# Patient Record
Sex: FEMALE | Race: WHITE | NOT HISPANIC OR LATINO | Employment: FULL TIME | URBAN - NONMETROPOLITAN AREA
[De-identification: names, ages, dates, MRNs, and addresses within clinical notes are randomized per-mention and may not be internally consistent; named-entity substitution may affect disease eponyms.]

---

## 2022-06-13 PROBLEM — E66.812 CLASS 2 OBESITY DUE TO EXCESS CALORIES WITH BODY MASS INDEX (BMI) OF 35.0 TO 35.9 IN ADULT: Status: ACTIVE | Noted: 2022-06-13

## 2022-12-07 ENCOUNTER — OFFICE VISIT (OUTPATIENT)
Dept: ENDOCRINOLOGY | Facility: CLINIC | Age: 32
End: 2022-12-07

## 2022-12-07 VITALS
HEART RATE: 96 BPM | WEIGHT: 207 LBS | HEIGHT: 64 IN | DIASTOLIC BLOOD PRESSURE: 78 MMHG | SYSTOLIC BLOOD PRESSURE: 122 MMHG | BODY MASS INDEX: 35.34 KG/M2

## 2022-12-07 DIAGNOSIS — E16.1 HYPERINSULINEMIA: Primary | ICD-10-CM

## 2022-12-07 DIAGNOSIS — E28.2 PCOS (POLYCYSTIC OVARIAN SYNDROME): ICD-10-CM

## 2022-12-07 DIAGNOSIS — R42 VERTIGO: ICD-10-CM

## 2022-12-07 RX ORDER — METFORMIN HYDROCHLORIDE 500 MG/1
1000 TABLET, EXTENDED RELEASE ORAL 2 TIMES DAILY WITH MEALS
Qty: 360 TABLET | Refills: 1 | Status: SHIPPED | OUTPATIENT
Start: 2022-12-07 | End: 2023-03-07

## 2022-12-07 RX ORDER — MECLIZINE HCL 12.5 MG/1
12.5 TABLET ORAL EVERY 12 HOURS PRN
Qty: 30 TABLET | Refills: 0 | Status: SHIPPED | OUTPATIENT
Start: 2022-12-07

## 2022-12-07 NOTE — PROGRESS NOTES
Jg Chadwick 27 y o  female MRN: [id number]    Encounter: [id number]      Assessment/Plan     1  Hyperinsulinemia - improving on recent labs  Will continue with metformin 1000 mg bid dosing, which can be continued safely into pregnancy  Continue to encourage active lifestyle and healthy eating  Will arrange future labs for monitoring  2  PCOS - now with more regular cycles  Desires pregnancy  Following with Ob/gyn and fertility    3  ?Vertigo - provided script for meclizine, per patient request  If symptoms do not improve on therapy, recommend follow up with PCP for alternative diagnosis    Problem List Items Addressed This Visit        Digestive    Hyperinsulinemia - Primary    Relevant Medications    metFORMIN (GLUCOPHAGE-XR) 500 mg 24 hr tablet    Other Relevant Orders    Basic metabolic panel Lab Collect    HEMOGLOBIN A1C W/ EAG ESTIMATION Lab Collect    Insulin, fasting       Endocrine    PCOS (polycystic ovarian syndrome)    Relevant Medications    metFORMIN (GLUCOPHAGE-XR) 500 mg 24 hr tablet    Other Relevant Orders    Basic metabolic panel Lab Collect    HEMOGLOBIN A1C W/ EAG ESTIMATION Lab Collect    Insulin, fasting   Other Visit Diagnoses     Vertigo        Relevant Medications    meclizine (ANTIVERT) 12 5 MG tablet        RTC 6-mo    CC: Hyperinsulinemia, PCOS    History of Present Illness     HPI:    Huan Trujillo returns for follow up of hyperinsulinemia, PCOS  Patient with interval history of gyn surgery for fulgeration of endometriosis, drainage of b/l ovarian cysts  Periods have improved since procedure  Patient is interested in pregnancy  She is actively trying for pregnancy and has plans to follow up with fertility medicine if pregnancy not diagnosed in several months  Huan Trujillo is taking and tolerating metformin 1000 mg bid  She is not checking SMBG, but has no hyperglycemic symptoms  She does report weekly episodes of symptoms of dizziness, which are spontaneous and non-provoked   She was previously treated with meclinzine for vertigo  Patient mentions concern for POTS, as well  She has not had LOC, but reports pre-syncopal like symptoms at times  Review of Systems   Constitutional: Negative for diaphoresis and unexpected weight change  HENT: Negative for trouble swallowing and voice change  Gastrointestinal: Negative for vomiting  Endocrine: Negative for polydipsia and polyuria  Neurological: Positive for dizziness and light-headedness  Negative for syncope  Psychiatric/Behavioral: Negative for agitation and behavioral problems  All other systems reviewed and are negative        Historical Information   Past Medical History:   Diagnosis Date   • Benign liver cyst    • Depression    • Ovarian cyst    • PONV (postoperative nausea and vomiting)    • Seasonal allergies      Past Surgical History:   Procedure Laterality Date   • APPENDECTOMY     • CHOLECYSTECTOMY  11/20/2014   • HERNIA REPAIR  2009    x3   • LIVER SURGERY  11/20/2014    liver cyst resection with removal of lesion   • OVARIAN CYST REMOVAL Right 2006   • MA HYSTEROSCOPY,W/ENDO BX N/A 7/21/2022    Procedure: HYSTEROSCOPY, BIOPSY;  Surgeon: Carlota Das DO;  Location: [facility];  Service: Gynecology   • MA LAP,RMV  ADNEXAL STRUCTURE N/A 7/21/2022    Procedure: LAPAROSCOPY; Creig Nightingale OF ENDOMETRIOSIS; DRAINAGE OF B/L OVARIAN CYSTS;;  Surgeon: Carlota Das DO;  Location: [facility];  Service: Gynecology     Social History   Social History     Substance and Sexual Activity   Alcohol Use Yes    Comment: Occasional; socially     Social History     Substance and Sexual Activity   Drug Use Never     Social History     Tobacco Use   Smoking Status Never   Smokeless Tobacco Never     Family History:   Family History   Problem Relation Age of Onset   • Hypertension Mother    • Irritable bowel syndrome Father    • Breast cancer Maternal Aunt    • Diabetes Maternal Grandfather    • Cancer Other        Meds/Allergies   Current Outpatient Medications   Medication Sig Dispense Refill   • fexofenadine-pseudoephedrine (ALLEGRA-D 24) 180-240 MG per 24 hr tablet Take 1 tablet by mouth daily 10 tablet 1   • ibuprofen (MOTRIN) 600 mg tablet Take 1 tablet (600 mg total) by mouth every 6 (six) hours as needed for mild pain 60 tablet 0   • meclizine (ANTIVERT) 12 5 MG tablet Take 1 tablet (12 5 mg total) by mouth every 12 (twelve) hours as needed for dizziness 30 tablet 0   • metFORMIN (GLUCOPHAGE-XR) 500 mg 24 hr tablet Take 2 tablets (1,000 mg total) by mouth 2 (two) times a day with meals 360 tablet 1   • sertraline (ZOLOFT) 50 mg tablet TAKE 1 TABLET DAILY 90 tablet 3   • Elagolix Sodium (Orilissa) 150 MG TABS Take 150 mg by mouth in the morning (Patient not taking: Reported on 12/7/2022)     • ibuprofen (MOTRIN) 600 mg tablet Take 1 tablet (600 mg total) by mouth every 6 (six) hours as needed for mild pain or moderate pain (Patient not taking: Reported on 12/7/2022) 30 tablet 0   • ondansetron (ZOFRAN) 4 mg tablet Take 1 tablet (4 mg total) by mouth every 8 (eight) hours as needed for nausea or vomiting for up to 3 days 10 tablet 0     No current facility-administered medications for this visit  Allergies   Allergen Reactions   • Amoxicillin Hives       Objective   Vitals: Blood pressure 122/78, pulse 96, height 5' 4" (1 626 m), weight 93 9 kg (207 lb), not currently breastfeeding  Physical Exam  Vitals reviewed  Constitutional:       Appearance: Normal appearance  HENT:      Head: Normocephalic and atraumatic  Nose: Nose normal    Eyes:      General: No scleral icterus  Conjunctiva/sclera: Conjunctivae normal    Cardiovascular:      Rate and Rhythm: Normal rate and regular rhythm  Pulmonary:      Effort: Pulmonary effort is normal  No respiratory distress  Musculoskeletal:      Right lower leg: No edema  Left lower leg: No edema  Neurological:      General: No focal deficit present        Mental Status: She is alert    Psychiatric:         Mood and Affect: Mood normal          Behavior: Behavior normal          The history was obtained from the review of the chart, patient  Lab Results:   Lab Results   Component Value Date/Time    Hemoglobin A1C 5 1 11/28/2022 09:18 AM    Hemoglobin A1C 5 1 04/30/2022 08:21 AM    WBC 8 47 04/30/2022 08:21 AM    WBC 6 79 02/23/2022 09:56 AM    Hemoglobin 13 8 04/30/2022 08:21 AM    Hemoglobin 14 4 02/23/2022 09:56 AM    Hematocrit 42 1 04/30/2022 08:21 AM    Hematocrit 43 8 02/23/2022 09:56 AM    MCV 91 04/30/2022 08:21 AM    MCV 92 02/23/2022 09:56 AM    Platelets 007 59/17/8380 08:21 AM    Platelets 346 95/07/0027 09:56 AM    BUN 8 11/28/2022 09:18 AM    BUN 12 04/30/2022 08:21 AM    BUN 10 02/23/2022 09:56 AM    Potassium 3 9 11/28/2022 09:18 AM    Potassium 4 6 04/30/2022 08:21 AM    Potassium 4 2 02/23/2022 09:56 AM    Chloride 109 (H) 11/28/2022 09:18 AM    Chloride 108 04/30/2022 08:21 AM    Chloride 106 02/23/2022 09:56 AM    CO2 23 11/28/2022 09:18 AM    CO2 25 04/30/2022 08:21 AM    CO2 24 02/23/2022 09:56 AM    Creatinine 0 77 11/28/2022 09:18 AM    Creatinine 0 89 04/30/2022 08:21 AM    Creatinine 0 84 02/23/2022 09:56 AM    AST 28 04/30/2022 08:21 AM    AST 19 02/23/2022 09:56 AM    ALT 33 04/30/2022 08:21 AM    ALT 28 02/23/2022 09:56 AM    Albumin 3 6 04/30/2022 08:21 AM    Albumin 3 6 02/23/2022 09:56 AM    HDL, Direct 38 (L) 04/30/2022 08:21 AM    Triglycerides 123 04/30/2022 08:21 AM           Imaging Studies: I have personally reviewed pertinent reports  Portions of the record may have been created with voice recognition software  Occasional wrong word or "sound a like" substitutions may have occurred due to the inherent limitations of voice recognition software  Read the chart carefully and recognize, using context, where substitutions have occurred

## 2022-12-27 ENCOUNTER — LAB (OUTPATIENT)
Dept: LAB | Facility: MEDICAL CENTER | Age: 32
End: 2022-12-27

## 2022-12-27 ENCOUNTER — TELEPHONE (OUTPATIENT)
Dept: OBGYN CLINIC | Facility: MEDICAL CENTER | Age: 32
End: 2022-12-27

## 2022-12-27 DIAGNOSIS — Z32.01 POSITIVE PREGNANCY TEST: ICD-10-CM

## 2022-12-27 DIAGNOSIS — Z32.01 POSITIVE PREGNANCY TEST: Primary | ICD-10-CM

## 2022-12-27 LAB
B-HCG SERPL-ACNC: 725 MIU/ML
PROGEST SERPL-MCNC: 17.3 NG/ML

## 2022-12-27 NOTE — TELEPHONE ENCOUNTER
Patient called into office with multiple positive pregnancy tests, LMP was 11/24/2022  Patient advised to have labs drawn at 6 weeks  Patient agreed to treatment plan and will call with any questions or concerns  Labs added to chart

## 2022-12-28 ENCOUNTER — TELEPHONE (OUTPATIENT)
Dept: OBGYN CLINIC | Facility: MEDICAL CENTER | Age: 32
End: 2022-12-28

## 2022-12-28 DIAGNOSIS — Z32.01 POSITIVE PREGNANCY TEST: Primary | ICD-10-CM

## 2022-12-28 NOTE — TELEPHONE ENCOUNTER
----- Message from Josue Elise MD sent at 12/28/2022 10:08 AM EST -----  Please call patient with results  Labs consistent with early pregnancy  4w6d by LMP  Given very early still, recommend repeat hcg tomorrow ideally  Will time US based on result

## 2022-12-28 NOTE — RESULT ENCOUNTER NOTE
Please call patient with results  Labs consistent with early pregnancy  4w6d by LMP  Given very early still, recommend repeat hcg tomorrow ideally  Will time US based on result

## 2022-12-30 ENCOUNTER — APPOINTMENT (OUTPATIENT)
Dept: LAB | Facility: MEDICAL CENTER | Age: 32
End: 2022-12-30

## 2022-12-30 DIAGNOSIS — Z32.01 POSITIVE PREGNANCY TEST: ICD-10-CM

## 2022-12-30 LAB — B-HCG SERPL-ACNC: 2052 MIU/ML

## 2023-01-04 NOTE — RESULT ENCOUNTER NOTE
Please call patient with results  Labs rising as expected  Currently 5w5d by LMP  Needs US for pregnancy dating/location  Can complete here or with radiology in 2-4wks

## 2023-02-11 ENCOUNTER — APPOINTMENT (OUTPATIENT)
Dept: LAB | Facility: MEDICAL CENTER | Age: 33
End: 2023-02-11

## 2023-02-11 DIAGNOSIS — Z34.90 PREGNANCY, UNSPECIFIED GESTATIONAL AGE: ICD-10-CM

## 2023-02-11 LAB — GLUCOSE 1H P 50 G GLC PO SERPL-MCNC: 122 MG/DL (ref 40–134)

## 2023-03-18 ENCOUNTER — APPOINTMENT (OUTPATIENT)
Dept: LAB | Facility: MEDICAL CENTER | Age: 33
End: 2023-03-18

## 2023-03-18 DIAGNOSIS — E16.1 HYPERINSULINEMIA: ICD-10-CM

## 2023-03-18 DIAGNOSIS — Z34.90 PREGNANCY, UNSPECIFIED GESTATIONAL AGE: ICD-10-CM

## 2023-03-18 DIAGNOSIS — E28.2 PCOS (POLYCYSTIC OVARIAN SYNDROME): ICD-10-CM

## 2023-03-18 LAB
ABO GROUP BLD: NORMAL
BASOPHILS # BLD AUTO: 0.01 THOUSANDS/ÂΜL (ref 0–0.1)
BASOPHILS NFR BLD AUTO: 0 % (ref 0–1)
BLD GP AB SCN SERPL QL: NEGATIVE
EOSINOPHIL # BLD AUTO: 0.04 THOUSAND/ÂΜL (ref 0–0.61)
EOSINOPHIL NFR BLD AUTO: 1 % (ref 0–6)
ERYTHROCYTE [DISTWIDTH] IN BLOOD BY AUTOMATED COUNT: 13.7 % (ref 11.6–15.1)
HCT VFR BLD AUTO: 38.8 % (ref 34.8–46.1)
HGB BLD-MCNC: 13 G/DL (ref 11.5–15.4)
IMM GRANULOCYTES # BLD AUTO: 0.02 THOUSAND/UL (ref 0–0.2)
IMM GRANULOCYTES NFR BLD AUTO: 0 % (ref 0–2)
LYMPHOCYTES # BLD AUTO: 1.58 THOUSANDS/ÂΜL (ref 0.6–4.47)
LYMPHOCYTES NFR BLD AUTO: 23 % (ref 14–44)
MCH RBC QN AUTO: 30.4 PG (ref 26.8–34.3)
MCHC RBC AUTO-ENTMCNC: 33.5 G/DL (ref 31.4–37.4)
MCV RBC AUTO: 91 FL (ref 82–98)
MONOCYTES # BLD AUTO: 0.27 THOUSAND/ÂΜL (ref 0.17–1.22)
MONOCYTES NFR BLD AUTO: 4 % (ref 4–12)
NEUTROPHILS # BLD AUTO: 4.84 THOUSANDS/ÂΜL (ref 1.85–7.62)
NEUTS SEG NFR BLD AUTO: 72 % (ref 43–75)
NRBC BLD AUTO-RTO: 0 /100 WBCS
PLATELET # BLD AUTO: 225 THOUSANDS/UL (ref 149–390)
PMV BLD AUTO: 11 FL (ref 8.9–12.7)
RBC # BLD AUTO: 4.28 MILLION/UL (ref 3.81–5.12)
RH BLD: POSITIVE
SPECIMEN EXPIRATION DATE: NORMAL
WBC # BLD AUTO: 6.76 THOUSAND/UL (ref 4.31–10.16)

## 2023-03-19 LAB
BACTERIA UR CULT: NORMAL
HBV SURFACE AB SER-ACNC: 5.06 MIU/ML
HBV SURFACE AG SER QL: NORMAL
HCV AB SER QL: NORMAL
HIV 1+2 AB+HIV1 P24 AG SERPL QL IA: NORMAL
HIV 2 AB SERPL QL IA: NORMAL
HIV1 AB SERPL QL IA: NORMAL
HIV1 P24 AG SERPL QL IA: NORMAL
RUBV IGG SERPL IA-ACNC: 109.6 IU/ML
TREPONEMA PALLIDUM IGG+IGM AB [PRESENCE] IN SERUM OR PLASMA BY IMMUNOASSAY: NORMAL

## 2023-07-03 DIAGNOSIS — F41.1 GENERALIZED ANXIETY DISORDER WITH PANIC ATTACKS: ICD-10-CM

## 2023-07-03 DIAGNOSIS — F41.0 GENERALIZED ANXIETY DISORDER WITH PANIC ATTACKS: ICD-10-CM

## 2023-07-18 ENCOUNTER — DOCUMENTATION (OUTPATIENT)
Dept: BEHAVIORAL/MENTAL HEALTH CLINIC | Facility: CLINIC | Age: 33
End: 2023-07-18

## 2023-07-18 ENCOUNTER — OFFICE VISIT (OUTPATIENT)
Dept: BEHAVIORAL/MENTAL HEALTH CLINIC | Facility: CLINIC | Age: 33
End: 2023-07-18

## 2023-07-18 DIAGNOSIS — F43.11 ACUTE POST-TRAUMATIC STRESS DISORDER: Primary | ICD-10-CM

## 2023-07-18 NOTE — PROGRESS NOTES
Assessment      Crisis Intake  Patient Intake  Living Arrangement: House, Lives with someone (pt lives with her  & 9year old daughter)  Can patient return home?: Yes  Address to be Discharge to[de-identified] see facesheet  Patient's Telephone Number: see facesheet  Access to Firearms: Yes  Describe Access to Weapons: Pt  has firearms that are locked in a safe that pt does NOT have access to. Type of Work: [place] [employer]  Work History: Full-time  Unemployed / [address] applicant[de-identified] n/a  Admission [address] of Residence: [place]  Act [address]: N/A  Patient History  Presenting Problems: Pt presents to [facility], accompanied by co-worker, stating "I am having a really hard time". Pt observed crying, head in hands. Pt reports she recently lost her son at approx 7 months pregnant & was in labor for 19 hours & delivered him stillborn. Pt reports she is severely struggling with motivation to do anything, including ADLs. Pt states she struggles to get out of bed and carry out her daily functions due to overwhelming grief & unresolved trauma. Pt reports passive SI of wondering "why did they save me"; in reference to the ED care team who helped pt deliver her son, as pt underwent numerous blood transfusions and was in & out of consciousness. Pt reports no HI/AH/VH, no hx of abuse, so hx of substance use & no legal issues. Pt reports that she is returning to work next week for the first time since she was pregnant, and is having a hard time with having to face co-workers & what they will say to pt or how they will console her. Pt reports she has been taking everything out on her  and 9year old daughter, and while pt is aware that her behavior isn't how she would like to react/respond, pt continues to act in a way that is non-congruent with her intent & is seeking therapeutic intervention.  Pt reports her eating is ok but her sleeping looks like 20 minutes every now & again, as pt states when she closes her eyes, she is reliving the birth of her son & so she cannot sleep. Pt reports good natural supports between family & friends, and would like professional supports as well. Pt scheduled with Dr. Alfred Delarosa, psychiatrist, for 7/31 @ 0130. Pt to be linked to therapy with Arielle Jenkins; follow up will occur via [address] [place] [address]. Treatment History: No current or previous psychiatrist. Previous therapist via phone, 1 visit.   Currently in Treatment: No  Name of ICM[de-identified] n/a  ICM Phone Number[de-identified] n/a  Community Agency Supports: none reported  Medical Problems: see medical record  Legal Issues: none reported  Probation/ Name (if applicable): n/a  Substance Abuse: No (pt reports she drinks occassionally)  Mental Status Exam  Orientation Level: Appropriate for age, Oriented X4  Affect: Appropriate  Speech: Pressured, Logical/coherent  Mood: Depressed  Thought Content: Appropriate  Hallucination Type: No problems reported or observed. (pt reports seeing a shadow in her daughters room the day that she was heading to the hospital to deliver her son; pt states she knew he was gone.)  Judgement: Fair  Impulse Control: Fair  Attention Span: Appropriate for age  Memory: Intact  Appetite: Good  Weight Changes: none reported  Sleep: Poor  Total Hours of Sleep: 20 minutes here & there  Specific Sleep Problem: pt unable to sleep due to reliving the birth of her son when she closes her eyes  Appear/Hygiene: Neatly Groomed  ADL Comments: independent; currently lacking motivation  Strengths and Limitations  Patient Strengths: Insightful, Family ties, Negotiates basic needs, Good support system, Cooperative  Patient Limitations: Difficulty adapting, Limited motivation, Poor past treatment response  Ideations  Current Self Harm/Suicidal Ideation: Yes  Description of current self harm/suicidal ideation[de-identified] passive SI-"why did they save me"; re:ED care team worked to save pt life through blood transfusions after delivering her stillborn son  Previous Self Harm/Suicidal Ideation: No  Description of self harming behaviors or thoughts[de-identified] passive SI-"why did they save me"  Violence Risk to Self: Yes- Within the past 6 months  Current homicidal or violent thoughts toward another: Denies ideations within past 6 months  Previous Plans to Harm Another Person: No  Violence Risk to Others: Denies within past 6 months  Previous History of Violence to Others: No  Provisional Diagnosis  Axis I: Acute post-traumatic stress disorder F43.11  Axis II: deferred  Axis III: see medical record  Intake Assessment Outcome  Patient Plan: Outpatient (Pt scheduled w/Dr. Christine Gauthier for psychiatry on Wednesday, 7/19 at [address]. Pt to be linked for therapy with Truist DinMAINtag in [facility].)    [address] [place] [address]  Suicide Severity Rating Scale  C-SSRS Q1. Wish to be Dead: Yes - In the Past Month (pt has thoughts "why did they save me", in reference to the ED team after childbirth, when pt delivered a stillborn son)  C-SSRS Q1. Suicidal Thoughts: No - In the Past Month  C-SSRS Q6. Suicide Behavior Question: No  *Risk Level*: Low Risk      Patient was referred by: Self or Family    Visit start and stop times:    07/18/23  Start Time: 1245  Stop Time: 1400  Total Visit Time: 75 minutes      Patient scheduled with Dr. Christine Gauthier for psychiatry, Wednesday, 7/19 at 130pm.    Patient to be linked with Truist Dines, Integrated Therapist, in [facility]. [facility] RAMON ORELLANA to follow up.

## 2023-07-18 NOTE — PROGRESS NOTES
Received call back from [place], [address] has been scheduled for therapy with Kendra Art for Tuesday, 7/25 @ 3pm.     Location: Main Entrance for [facility] in [facility], located at [address]. Call placed to pt to advise of the above appointment information. Left VM for pt to contact this writer back to review upcoming appointment next week.

## 2023-07-18 NOTE — PROGRESS NOTES
Call placed to [facility] @ [phone]. Left VM to schedule pt for therapy with Louie Horn. Will await return phone call.

## 2023-07-18 NOTE — PATIENT INSTRUCTIONS
Patient scheduled with Dr. Annemarie Salas for psychiatry, Wednesday, 7/19 at 130pm.  Patient to be linked with Willian Boo, Integrated Therapist, in [facility].

## 2023-07-19 ENCOUNTER — DOCUMENTATION (OUTPATIENT)
Dept: PSYCHIATRY | Facility: CLINIC | Age: 33
End: 2023-07-19

## 2023-07-19 ENCOUNTER — OFFICE VISIT (OUTPATIENT)
Dept: PSYCHIATRY | Facility: CLINIC | Age: 33
End: 2023-07-19
Payer: COMMERCIAL

## 2023-07-19 DIAGNOSIS — F43.0 ACUTE STRESS DISORDER: ICD-10-CM

## 2023-07-19 PROCEDURE — 90792 PSYCH DIAG EVAL W/MED SRVCS: CPT | Performed by: STUDENT IN AN ORGANIZED HEALTH CARE EDUCATION/TRAINING PROGRAM

## 2023-07-19 RX ORDER — PRAZOSIN HYDROCHLORIDE 2 MG/1
2 CAPSULE ORAL
Qty: 30 CAPSULE | Refills: 1 | Status: SHIPPED | OUTPATIENT
Start: 2023-07-19

## 2023-07-19 RX ORDER — BUPROPION HYDROCHLORIDE 150 MG/1
150 TABLET ORAL DAILY
Qty: 30 TABLET | Refills: 1 | Status: SHIPPED | OUTPATIENT
Start: 2023-07-19
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
## 2023-07-25 ENCOUNTER — SOCIAL WORK (OUTPATIENT)
Dept: BEHAVIORAL/MENTAL HEALTH CLINIC | Facility: CLINIC | Age: 33
End: 2023-07-25
Payer: COMMERCIAL

## 2023-07-25 DIAGNOSIS — F32.A MILD DEPRESSION: ICD-10-CM

## 2023-07-25 DIAGNOSIS — F43.11 ACUTE POST-TRAUMATIC STRESS DISORDER: Primary | ICD-10-CM

## 2023-07-25 PROCEDURE — 90791 PSYCH DIAGNOSTIC EVALUATION: CPT | Performed by: SOCIAL WORKER

## 2023-07-25 NOTE — PSYCH
Assessment/Plan:      Diagnoses and all orders for this visit:    Acute post-traumatic stress disorder    Mild depression          Subjective: Pt presented for initial therapy session. Pt was seen at the [facility] on 7/18/23 where she met with a therapist.  She also saw a psychiatrist on 7/19/23 who started her on medication. Pt experienced a fetal demise at 20 weeks on 5/31/23 (Yana Hicks). Upon delivery of the stillborn, it was determined that the baby had the umbilical cord wrapped around his neck two times. As previously known, the baby had Trisomy 21 and a mild cardiac anomaly. Pt is  to her , Fidencio Tamayo. Pt has a 9yo daughter, Lawson Salazar from another man. Pt works as a [employer] in the [employer] at [employer]. She enjoys spending time with family, going for walks and going to the beach. Pt's sleep has been difficult but her appetite has been good. She is lacking motivation and spends most of the day in the house. She is returning to work on Thursday. Processed pt's feelings at length, offered validation and discussed coping skills. Patient ID: Eriberto Saravia is a 26 y.o. female. HPI    Review of Systems      Objective:     Physical Exam  Psychiatric:         Behavior: Behavior is cooperative. Behavioral Health Psychotherapy Assessment    Date of Initial Psychotherapy Assessment: 07/25/23  Referral Source: [facility]  Has a release of information been signed for the referral source? No    Preferred Name: Eriberto Saravia  Preferred Pronouns: She/her  YOB: 1990 Age: 26 y.o. Sex assigned at birth: female   Gender Identity: Female  Race:   Preferred Language: English    Emergency Contact:  Full Name: Arnie Alan  Relationship to Client: Spouse  Contact information: [phone]    Primary Care Physician:  Joesph Tucker DO  [address] [place] [address]  [phone]  Has a release of information been signed? No    Physical Health History:  Past surgical procedures: see chart  Do you have a history of any of the following: other na  Do you have any mobility issues? No    Relevant Family History:  None    Presenting Problem (What brings you in?)  Depression; Grief of fetal demise at 20 weeks    Mental Health Advance Directive:  Do you currently have a Mental Health Advance Directive? no    Diagnosis:   Diagnosis ICD-10-CM Associated Orders   1. Acute post-traumatic stress disorder  F43.11       2. Mild depression  F32. A           Initial Assessment:     Current Mental Status:    Appearance: appropriate      Behavior/Manner: cooperative      Affect/Mood:  Sad    Speech:  Normal    Oriented to: oriented to self, oriented to place and oriented to time       Clinical Symptoms    Have you ever been self-injurious: No      Counseling History:  Previous Counseling or Treatment  (Mental Health or Drug & Alcohol): Yes    Previous Counseling Details:  Pt seen at the [facility] on 7/18/23  Have you previously taken psychiatric medications: Yes    Previous Medications Attempted:  Pt saw psychiatry on 7/19/23 - prescribed Wellbutrin 150mg/day; Minipress 2mg HS    Suicide Risk Assessment  Have you ever had a suicide attempt: No    Are you currently experiencing suicidal thoughts: No      Substance Abuse/Addiction Assessment:  Alcohol: Yes    Frequency:  Other  Other frequency:  Social  Heroin: No    Fentanyl: No    Opiates: No    Cocaine: No    Amphetamines: No    Hallucinogens: No    Club Drugs: No    Benzodiazepines: No    Other Rx Meds: No    Marijuana: No    Tobacco/Nicotine: No    Have you experienced blackouts as a result of substance use: No    Are you currently using any Medication Assisted Treatment for Substance Use: No      Disordered Eating History:  Do you have a history of disordered eating: No      Social Determinants of Health:    SDOH:  None    Trauma and Abuse History:    Have you ever been abused:  No Relationship History:    Current marital status:       Natural Supports:   Mother, father and siblings    Employment History    Are you currently employed: Yes      Employer/ Job title:  [employer]    Sources of income/financial support:  Work     History:      Status: no history of [address] duty    Recommended Treatment:     Psychotherapy:  Individual sessions    Frequency:  1 time    Session frequency:  Weekly      Visit start and stop times:    07/25/23  Start Time: 1500  Stop Time: 1463  Total Visit Time: 35 minutes

## 2023-08-01 ENCOUNTER — SOCIAL WORK (OUTPATIENT)
Dept: BEHAVIORAL/MENTAL HEALTH CLINIC | Facility: CLINIC | Age: 33
End: 2023-08-01
Payer: COMMERCIAL

## 2023-08-01 DIAGNOSIS — F43.11 ACUTE POST-TRAUMATIC STRESS DISORDER: Primary | ICD-10-CM

## 2023-08-01 DIAGNOSIS — F32.A MILD DEPRESSION: ICD-10-CM

## 2023-08-01 PROCEDURE — 90832 PSYTX W PT 30 MINUTES: CPT | Performed by: SOCIAL WORKER

## 2023-08-01 NOTE — PSYCH
Assessment/Plan:      Diagnoses and all orders for this visit:    Acute post-traumatic stress disorder    Mild depression          Subjective: Pt presented for follow up therapy session. Pt reported that she's still not sleeping. She will be discussing this at her med [facility] appointment next week. Pt returned to work last week and this has been going well. She is able to get her work done without issue. She does find herself easily distracted at home however. 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 Processed pt's feelings about this and discussed how to handle this. Pt has found that her anxiety is heightened in the evening now that she is back to work. Discussed additional coping skills as the guided meditation was not helpful. Pt's appetite and fluid intake remain good. Patient ID: Susan Hawkins is a 26 y.o. female. HPI    Review of Systems      Objective:     Physical Exam      Behavioral Health Psychotherapy Progress Note    Psychotherapy Provided: Individual Psychotherapy     1. Acute post-traumatic stress disorder        2. Mild depression            Goals addressed in session: Goal 1     DATA:   During this session, this clinician used the following therapeutic modalities: Engagement Strategies, Client-centered Therapy, Family Therapy, Mindfulness-based Strategies, Supportive Psychotherapy and Grief Support    Substance Abuse was not addressed during this session. If the client is diagnosed with a co-occurring substance use disorder, please indicate any changes in the frequency or amount of use: na. Stage of change for addressing substance use diagnoses: No substance use/Not applicable    ASSESSMENT:  Susan Hawkins presents with a Depressed mood. her affect is Tearful, which is congruent, with her mood and the content of the session. The client has made progress on their goals.      Susan Hawkins presents with a none risk of suicide, none risk of self-harm, and none risk of harm to others. For any risk assessment that surpasses a "low" rating, a safety plan must be developed. A safety plan was indicated: no  If yes, describe in detail na    PLAN: Between sessions, Kahlil Lyon will continue to utilize coping skills. At the next session, the therapist will use Engagement Strategies, Client-centered Therapy, Mindfulness-based Strategies, Supportive Psychotherapy and Grief support to address PTSD and depression. Behavioral Health Treatment Plan and Discharge Planning: Kahlil Lyon is aware of and agrees to continue to work on their treatment plan. They have identified and are working toward their discharge goals.  yes    Visit start and stop times:    08/01/23  Start Time: 7706  Stop Time: 1437  Total Visit Time: 32 minutes

## 2023-08-08 ENCOUNTER — OFFICE VISIT (OUTPATIENT)
Dept: PSYCHIATRY | Facility: CLINIC | Age: 33
End: 2023-08-08
Payer: COMMERCIAL

## 2023-08-08 DIAGNOSIS — F43.0 ACUTE STRESS DISORDER: ICD-10-CM

## 2023-08-08 PROCEDURE — 99214 OFFICE O/P EST MOD 30 MIN: CPT | Performed by: STUDENT IN AN ORGANIZED HEALTH CARE EDUCATION/TRAINING PROGRAM

## 2023-08-08 PROCEDURE — 90833 PSYTX W PT W E/M 30 MIN: CPT | Performed by: STUDENT IN AN ORGANIZED HEALTH CARE EDUCATION/TRAINING PROGRAM

## 2023-08-08 RX ORDER — TRAZODONE HYDROCHLORIDE 50 MG/1
50 TABLET ORAL
Qty: 30 TABLET | Refills: 1 | Status: SHIPPED | OUTPATIENT
Start: 2023-08-08

## 2023-08-08 RX ORDER — BUPROPION HYDROCHLORIDE 150 MG/1
150 TABLET, EXTENDED RELEASE ORAL DAILY
Qty: 30 TABLET | Refills: 1 | Status: SHIPPED | OUTPATIENT
Start: 2023-08-08

## 2023-08-09 NOTE — PSYCH
MEDICATION MANAGEMENT NOTE        [facility]      Name and Date of Birth:  Reina Pepper 26 y.o. 1990 MRN: [id number]    Date of Visit: August 8, 2023  Visit Time    Visit Start Time: 6167  Visit Stop Time: 1600  Total Visit Duration: 30 minutes      Reason for Visit: Follow-up visit regarding medication management     Chief Complaint: "I feel a bit better, but I still can't sleep."    SUBJECTIVE:    Reina Pepper is a 26 y.o., female, possessing a past psychiatric history significant for anxiety, who recently experienced significant stress following the in-utero fetal demise and having to deliver still born baby at 10 months in June 2023, who was personally seen and evaluated at the [address] [place] [address] outpatient clinic for follow-up regarding medication management. Juan Miguel Marquez states that since their previous outpatient psychiatric appointment, she has been doing "a little better". She states she is tolerating the Wellbutrin well, and feels it has improved her mood better than the Cymbalta. She states she is having more energy, and getting out of the house more. She has returned to work, which she states is a good distraction, and she has been successfully going out into the community more. She states she is still having crying spells during the day, and admits that she sometimes feels guilty if she "feels like I am forgetting my son ". She states that she feels that this was very unfair for her to experience this, and that she has not made sense of why it had to happen to her. She states that she did talk with another woman who experienced something similar 8 years ago. She states that this has been helpful for her, as she admits that she finds it difficult to relate to anyone else who has not experienced this kind of loss.   She states that she is nervous about the upcoming school year, as she is worried that when the teachers return, she will be bombarded with sympathy. 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 She states her  has also been very supportive, and he has admitted that he also feels he has missed out on " just thinking what my son could have become, mourning the life he never had ". Chasity Martins states she has been trying to focus more on her daughter as well, and denies thoughts to hurt herself or anyone else. She states that she still finds her self having difficulty falling asleep, and admits that she does possibly feel the medication could still be in her system when she tries to shut her eyes. She states that she still has nightmares at times, and would like to try trazodone to help with sleep, as she states that her friend who is an OB/GYN recommended it to her. She states that she is planning on getting away with some girlfriends on the actual due date for her pregnancy. She states that she has not visited her son's grave, but knows "that I need to do that ". She states that therapy has been helpful as well. Overall, she states that she would be willing to try adjusting her medications to see if that is causing some of her sleep issues and having trazodone as needed. States she does not feel the prazosin has been helpful at all for her sleep issues. Current Rating Scores:   None completed today. Psychiatric Review Of Systems:    Appetite: increased  Adverse eating: no  Weight changes: no  Insomnia/sleeplessness: yes  Fatigue/anergy: no, improving  Anhedonia/lack of interest: some days  Attention/concentration: no change  Psychomotor agitation/retardation: no  Somatic symptoms: no  Anxiety/panic attack: worrying  Ana María/hypomania: no  Hopelessness/helplessness/worthlessness: yes, sometimes feels helpless with the emotional pain.   Self-injurious behavior/high-risk behavior: no  Suicidal ideation: no  Homicidal ideation: no  Auditory hallucinations: no  Visual hallucinations: no  Other perceptual disturbances: no  Delusional thinking: no  Obsessive/compulsive symptoms: no    Review Of Systems:      Constitutional negative   ENT negative   Cardiovascular negative   Respiratory negative   Gastrointestinal negative   Genitourinary negative   Musculoskeletal negative   Integumentary negative   Neurological negative   Endocrine negative   Other Symptoms none, all other systems are negative     History Review: The following portions of the patient's history were reviewed and updated as appropriate: allergies, current medications, past family history, past medical history, past social history, past surgical history and problem list..         OBJECTIVE:     Vital signs in last 24 hours: There were no vitals filed for this visit.     Mental Status Evaluation:    Appearance age appropriate, casually dressed   Behavior cooperative, calm   Speech normal rate, normal volume, normal pitch   Mood slightly less dysphoric   Affect tearful, but ramsay range   Thought Processes organized, goal directed   Associations intact associations, less perseverative on her son's death   Thought Content no overt delusions   Perceptual Disturbances: no auditory hallucinations, no visual hallucinations   Abnormal Thoughts  Risk Potential Suicidal ideation - None  Homicidal ideation - None  Potential for aggression - No   Orientation oriented to person, place, time/date and situation   Memory recent and remote memory grossly intact   Consciousness alert and awake   Attention Span Concentration Span attention span and concentration are age appropriate   Intellect appears to be of average intelligence   Insight intact   Judgement intact   Muscle Strength and  Gait normal muscle strength and normal muscle tone, normal gait and normal balance   Motor activity no abnormal movements   Fund of Knowledge adequate knowledge of current events  adequate fund of knowledge regarding past history  adequate fund of knowledge regarding vocabulary    Pain none   Pain Scale 0       Laboratory Results: I have personally reviewed all pertinent laboratory/tests results    Recent Labs (last 2 months):   No visits with results within 2 Month(s) from this visit.    Latest known visit with results is:   Appointment on 03/18/2023   Component Date Value   • ABO Grouping 03/18/2023 B    • Rh Factor 03/18/2023 Positive    • Antibody Screen 03/18/2023 Negative    • Specimen Expiration Date 03/18/2023 [id number]    • Urine Culture 03/18/2023 No Growth <1000 cfu/mL    • WBC 03/18/2023 6.76    • RBC 03/18/2023 4.28    • Hemoglobin 03/18/2023 13.0    • Hematocrit 03/18/2023 38.8    • MCV 03/18/2023 91    • MCH 03/18/2023 30.4    • MCHC 03/18/2023 33.5    • RDW 03/18/2023 13.7    • MPV 03/18/2023 11.0    • Platelets 46/37/6758 225    • nRBC 03/18/2023 0    • Neutrophils Relative 03/18/2023 72    • Immat GRANS % 03/18/2023 0    • Lymphocytes Relative 03/18/2023 23    • Monocytes Relative 03/18/2023 4    • Eosinophils Relative 03/18/2023 1    • Basophils Relative 03/18/2023 0    • Neutrophils Absolute 03/18/2023 4.84    • Immature Grans Absolute 03/18/2023 0.02    • Lymphocytes Absolute 03/18/2023 1.58    • Monocytes Absolute 03/18/2023 0.27    • Eosinophils Absolute 03/18/2023 0.04    • Basophils Absolute 03/18/2023 0.01    • Rubella IgG Quant 03/18/2023 109.6    • Hep B S Ab 03/18/2023 5.06    • Hepatitis B Surface Ag 03/18/2023 Non-reactive    • Hepatitis C Ab 03/18/2023 Non-reactive    • HIV-1 p24 Antigen 03/18/2023 Non-Reactive    • HIV-1 Antibody 03/18/2023 Non-Reactive    • HIV-2 Antibody 03/18/2023 Non-Reactive    • HIV Ag-Ab 5th Gen 03/18/2023 Non-Reactive    • Syphilis Total Antibody 03/18/2023 Non-reactive        Suicide/Homicide Risk Assessment:    The following interventions are recommended: contracts for safety at present - agrees to go to ED if feeling unsafe, contracts for safety at present - agrees to call Crisis Intervention Service if feeling unsafe. Although patient's acute lethality risk is low, long-term/chronic lethality risk is mildly elevated in the presence of depression. At the current moment, Shu Argueta is future-oriented, forward-thinking, and demonstrates ability to act in a self-preserving manner as evidenced by volitionally presenting to the clinic today, seeking treatment. To mitigate future risk, patient should adhere to the recommendations below, avoid alcohol/illicit substance use, utilize community-based resources and familiar support and prioritize mental health treatment. Presently, patient denies active suicidal/homicidal ideation in addition to thoughts of self-injury; contracts for safety. At conclusion of evaluation, patient is amenable to the recommendations below. Patient is amenable to calling/contacting the outpatient office including this writer if any acute adverse effects of their medication regimen arise in addition to any comments or concerns pertaining to their psychiatric management. Patient is amenable to calling/contacting crisis and/or attending to the nearest emergency department if their clinical condition deteriorates to assure their safety and stability, stating that they are able to appropriately confide in their  regarding their psychiatric state. Assessment/Plan:     Diagnoses and all orders for this visit:    Postpartum depression  -     buPROPion (Wellbutrin SR) 150 mg 12 hr tablet; Take 1 tablet (150 mg total) by mouth in the morning  -     traZODone (DESYREL) 50 mg tablet; Take 1 tablet (50 mg total) by mouth daily at bedtime as needed for sleep May try cutting in half. Acute stress disorder    Shu Argueta is a 26year old female who is struggling with post-partum depression. She had some depression and anxiety in the past, but symptoms have been made significantly worse by the death of her son in utero.   She is still struggling with some nightmares and flashbacks related to the incident, but improving somewhat as she has been better able to distract herself with work. Wellbutrin seems to have helped her symptoms somewhat, may be experiencing some insomnia related to the medication. She seems to be making progress, has a lot of support, and does not appear to be in acute danger to herself or others at this time. Treatment Recommendations/Precautions:    • Will change Wellbutrin from 150mg XL to 150mg SR, to see if this improves insomnia. Will also start Trazodone 25-50mg qHS PRN insomnia for her to use if she is still having these symptoms, may also help with her acute stress. • Medication management every 4 weeks  • Continue psychotherapy with own therapist  • Aware of 24 hour and weekend coverage for urgent situations accessed by calling [facility] practice number  Patient advised to call 911 if feeling suicidal or homicidal before acting out on their thoughts and they expressed understanding. Medications Risks/Benefits      Risks, Benefits And Possible Side Effects Of Medications:    Risks, benefits, and possible side effects of medications explained to [facility] and she verbalizes understanding and agreement for treatment. including: Risks and potential side effects of medication discussed with patient including serotonin syndrome, SIADH, worsening depression, suicidality, induction of zay, GI upset, headaches, activation, sexual side effects, sedation, potential drug interactions, and others. Patient expressed understanding. .     Controlled Medication Discussion:     Not applicable    Psychotherapy Provided:     Individual psychotherapy provided: Yes  Counseling was provided during the session today for 16 minutes. Medication changes discussed with Makayla. Medication education provided to [facility].   Goals discussed during in session: improve depression, improve sleep and increase interaction with people in the community. Recent stressor including unborn son's's death discussed with [place]. Discussed with [place] coping with people offering emotional support. Reassurance and supportive therapy provided.       Treatment Plan:    Completed and signed during the session: Not applicable - Treatment Plan not due at this session    This note was not shared with the patient due to this is a psychotherapy note      Carl Castle DO 08/09/23

## 2023-08-15 ENCOUNTER — SOCIAL WORK (OUTPATIENT)
Dept: BEHAVIORAL/MENTAL HEALTH CLINIC | Facility: CLINIC | Age: 33
End: 2023-08-15
Payer: COMMERCIAL

## 2023-08-15 DIAGNOSIS — F43.11 ACUTE POST-TRAUMATIC STRESS DISORDER: Primary | ICD-10-CM

## 2023-08-15 DIAGNOSIS — F32.A MILD DEPRESSION: ICD-10-CM

## 2023-08-15 PROCEDURE — 90832 PSYTX W PT 30 MINUTES: CPT | Performed by: SOCIAL WORKER

## 2023-08-15 NOTE — PSYCH
Assessment/Plan:      Diagnoses and all orders for this visit:    Acute post-traumatic stress disorder    Mild depression          Subjective: Pt presented for follow up therapy session. Pt reported that things are a little better. The psychiatrist changed her Wellbutrin to XR (instead of XL) and added Trazadone for sleep. Pt is sleeping a little better and reports she is getting about 2-3 hours of sleep vs only 20 minutes. Her appetite remains consistent and she is eating healthy meals. Work is a good distraction for her and she tends to dwell on her loss when she is at home. 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 She has a statement prepared if she does not want to talk about her loss. She will be taking off from work on her "due date" of 8/31 and she is going out with 2 friends who are planning the day for her. Pt has been going for walks and writing in a journal which she also finds helpful. Overall, pt is slowly improving. Patient ID: Jacques Mccracken is a 26 y.o. female. HPI    Review of Systems      Objective:     Physical Exam      Behavioral Health Psychotherapy Progress Note    Psychotherapy Provided: Individual Psychotherapy     1. Acute post-traumatic stress disorder        2. Mild depression            Goals addressed in session: Goal 1     DATA:   During this session, this clinician used the following therapeutic modalities: Engagement Strategies, Client-centered Therapy, Mindfulness-based Strategies, Supportive Psychotherapy and Grief therapy    Substance Abuse was not addressed during this session. If the client is diagnosed with a co-occurring substance use disorder, please indicate any changes in the frequency or amount of use: na. Stage of change for addressing substance use diagnoses: No substance use/Not applicable    ASSESSMENT:  Jacques Mccracken presents with a Euthymic/ normal mood.      her affect is Normal range and intensity, which is congruent, with her mood and the content of the session. The client has made progress on their goals. Zaheer Galvez presents with a none risk of suicide, none risk of self-harm, and none risk of harm to others. For any risk assessment that surpasses a "low" rating, a safety plan must be developed. A safety plan was indicated: no  If yes, describe in detail na    PLAN: Between sessions, Zaheer Galvez will continue to utilize coping skills. At the next session, the therapist will use Engagement Strategies, Client-centered Therapy, Mindfulness-based Strategies, Supportive Psychotherapy and Grief therapy to address depression. Behavioral Health Treatment Plan and Discharge Planning: Zaheer Galvez is aware of and agrees to continue to work on their treatment plan. They have identified and are working toward their discharge goals.  yes    Visit start and stop times:    08/15/23  Start Time: 1505  Stop Time: 1527  Total Visit Time: 22 minutes

## 2023-08-28 ENCOUNTER — SOCIAL WORK (OUTPATIENT)
Dept: BEHAVIORAL/MENTAL HEALTH CLINIC | Facility: CLINIC | Age: 33
End: 2023-08-28
Payer: COMMERCIAL

## 2023-08-28 DIAGNOSIS — F32.A MILD DEPRESSION: Primary | ICD-10-CM

## 2023-08-28 DIAGNOSIS — F43.10 PTSD (POST-TRAUMATIC STRESS DISORDER): ICD-10-CM

## 2023-08-28 PROCEDURE — 90832 PSYTX W PT 30 MINUTES: CPT | Performed by: SOCIAL WORKER

## 2023-08-28 NOTE — PSYCH
Assessment/Plan:      Diagnoses and all orders for this visit:    Mild depression    PTSD (post-traumatic stress disorder)          Subjective:  Pt presented for follow up therapy session. Pt reported that this week is difficult for her as her due date would have been Thursday. She has been very sad and tearful. She continues to blame herself at times and struggles to understand why she had to lose her baby. One of patient's friends wrote an email to all the teachers at school advising what happened over the summer and requested that no one speak to pt or her  about their loss. Pt and her  have been doing well and she feels things are getting "better."  Pt's sleep is somewhat improved and her appetite remains generally okay. Pt finds the down time at home the most difficult time for her. Discussed constructive activities to keep herself occupied when at home. Encouraged pt to continue to utilize coping skills. Patient ID: Tono Barbosa is a 26 y.o. female. HPI    Review of Systems      Objective:     Physical Exam      Behavioral Health Psychotherapy Progress Note    Psychotherapy Provided: Individual Psychotherapy     1. Mild depression        2. PTSD (post-traumatic stress disorder)            Goals addressed in session: Goal 1     DATA:   During this session, this clinician used the following therapeutic modalities: Engagement Strategies, Client-centered Therapy, Mindfulness-based Strategies and Supportive Psychotherapy    Substance Abuse was not addressed during this session. If the client is diagnosed with a co-occurring substance use disorder, please indicate any changes in the frequency or amount of use: na. Stage of change for addressing substance use diagnoses: No substance use/Not applicable    ASSESSMENT:  Tono Barbosa presents with a Depressed mood. her affect is Normal range and intensity and Tearful, which is congruent, with her mood and the content of the session.  The client has made progress on their goals. Cameron Handley presents with a none risk of suicide, none risk of self-harm, and none risk of harm to others. For any risk assessment that surpasses a "low" rating, a safety plan must be developed. A safety plan was indicated: no  If yes, describe in detail na    PLAN: Between sessions, Cameron Handley will continue to utilize coping skills. At the next session, the therapist will use Engagement Strategies, Client-centered Therapy, Mindfulness-based Strategies and Supportive Psychotherapy to address depression. Behavioral Health Treatment Plan and Discharge Planning: Cameron Handley is aware of and agrees to continue to work on their treatment plan. They have identified and are working toward their discharge goals.  yes    Visit start and stop times:    08/28/23  Start Time: 1342  Stop Time: 1615  Total Visit Time: 28 minutes

## 2023-09-07 ENCOUNTER — OFFICE VISIT (OUTPATIENT)
Dept: PSYCHIATRY | Facility: CLINIC | Age: 33
End: 2023-09-07
Payer: COMMERCIAL

## 2023-09-07 DIAGNOSIS — F43.0 ACUTE STRESS DISORDER: ICD-10-CM

## 2023-09-07 PROCEDURE — 90833 PSYTX W PT W E/M 30 MIN: CPT | Performed by: STUDENT IN AN ORGANIZED HEALTH CARE EDUCATION/TRAINING PROGRAM

## 2023-09-07 PROCEDURE — 99214 OFFICE O/P EST MOD 30 MIN: CPT | Performed by: STUDENT IN AN ORGANIZED HEALTH CARE EDUCATION/TRAINING PROGRAM

## 2023-09-07 RX ORDER — TRAZODONE HYDROCHLORIDE 100 MG/1
100 TABLET ORAL
Qty: 30 TABLET | Refills: 1 | Status: SHIPPED | OUTPATIENT
Start: 2023-09-07

## 2023-09-07 RX ORDER — BUPROPION HYDROCHLORIDE 150 MG/1
150 TABLET, EXTENDED RELEASE ORAL DAILY
Qty: 90 TABLET | Refills: 1 | Status: SHIPPED | OUTPATIENT
Start: 2023-09-07

## 2023-09-12 ENCOUNTER — SOCIAL WORK (OUTPATIENT)
Dept: BEHAVIORAL/MENTAL HEALTH CLINIC | Facility: CLINIC | Age: 33
End: 2023-09-12
Payer: COMMERCIAL

## 2023-09-12 DIAGNOSIS — F32.1 CURRENT MODERATE EPISODE OF MAJOR DEPRESSIVE DISORDER WITHOUT PRIOR EPISODE (HCC): Primary | ICD-10-CM

## 2023-09-12 PROCEDURE — 90832 PSYTX W PT 30 MINUTES: CPT | Performed by: SOCIAL WORKER

## 2023-09-12 NOTE — PSYCH
Assessment/Plan:      Diagnoses and all orders for this visit:    Current moderate episode of major depressive disorder without prior episode ([address])          Subjective: Pt presented for follow up therapy session. Pt struggled the week of her due date. She went out with her friends on the due date where they had lunch and pt enjoyed her time out of the house. Pt's sleep fluctuates but is somewhat improving. Her appetite continues to be good. Pt is finding it helpful to be back at work and now that the school year started, it is very busy. Pt continues to struggle with her loss. Processed pt's feelings and encouraged continued use of coping skills. Patient ID: Shauna Gregg is a 26 y.o. female. HPI    Review of Systems      Objective:     Physical Exam      Behavioral Health Psychotherapy Progress Note    Psychotherapy Provided: Individual Psychotherapy     1. Current moderate episode of major depressive disorder without prior episode ([address])            Goals addressed in session: Goal 1     DATA:   During this session, this clinician used the following therapeutic modalities: Engagement Strategies, Client-centered Therapy, Mindfulness-based Strategies and Supportive Psychotherapy    Substance Abuse was not addressed during this session. If the client is diagnosed with a co-occurring substance use disorder, please indicate any changes in the frequency or amount of use: na. Stage of change for addressing substance use diagnoses: No substance use/Not applicable    ASSESSMENT:  Shauna Gregg presents with a Euthymic/ normal mood. her affect is Normal range and intensity, which is congruent, with her mood and the content of the session. The client has made progress on their goals. Shauna Gregg presents with a none risk of suicide, none risk of self-harm, and none risk of harm to others. For any risk assessment that surpasses a "low" rating, a safety plan must be developed.     A safety plan was indicated: no  If yes, describe in detail na    PLAN: Between sessions, Deborah Meng will continue to utilize coping skills. At the next session, the therapist will use Engagement Strategies, Client-centered Therapy, Mindfulness-based Strategies and Supportive Psychotherapy to address depression. Behavioral Health Treatment Plan and Discharge Planning: Deborah Meng is aware of and agrees to continue to work on their treatment plan. They have identified and are working toward their discharge goals.  yes    Visit start and stop times:    09/12/23  Start Time: 1703  Stop Time: 1720  Total Visit Time: 17 minutes

## 2023-09-12 NOTE — PSYCH
MEDICATION MANAGEMENT NOTE        [facility]      Name and Date of Birth:  Dedrick Angel 26 y.o. 1990 MRN: [id number]    Date of Visit: 2023  Visit Time    Visit Start Time: 400  Visit Stop Time:   Total Visit Duration: 30 minutes      Reason for Visit: Follow-up visit regarding medication management     Chief Complaint: "I feel like I missed out on seeing him."    SUBJECTIVE:    Dedrick Angel is a 26 y.o., female, possessing a past psychiatric history significant for anxiety, who recently experienced significant stress following the in-utero fetal demise and having to deliver still born baby at 10 months in 2023, who was personally seen and evaluated at the [address] [place] [address] outpatient clinic for follow-up regarding medication management. Nasim Hdz is currently prescribed Wellbutrin  mg daily, and trazodone 50 mg nightly. During interview today, Nasim Hdz states that she is feeling "pretty good ". She states that she has found work a good distraction from her depression. She states that this has kept her from worrying about the past, although she admits that she still is feeling sad when it was the due date for her  son. She states that she has gone to the grave a few times, and did end up keeping herself distracted by spending time with girlfriends on that day. She states that she still feels sad and depressed at times, especially when she is home. She states that she does feel her  is supportive, although she admits that "he grieves differently ". She states that she enjoys spending time with her daughter, although she admits she still has trouble staying focused at times. She states that she does not feel as irritable, and does feel that the Wellbutrin has helped with her depression. She states she still has crying spells at times, but they are less frequent.   She states that she does feel she is getting better, although she sometimes wonders if she should have gotten an autopsy. She states that therapy has been going well for her. She denies thoughts to hurt himself or anyone else, denies any hallucinations. She states that she is falling asleep okay, but still finds himself waking up multiple times throughout the night. She states that she would like to try possibly increasing her trazodone to see if this helps. Current Rating Scores:   None completed today. Psychiatric Review Of Systems:    Appetite: increased  Adverse eating: no  Weight changes: no  Insomnia/sleeplessness: yes  Fatigue/anergy: no, improving  Anhedonia/lack of interest: some days  Attention/concentration: no change  Psychomotor agitation/retardation: no  Somatic symptoms: no  Anxiety/panic attack: worrying  Ana María/hypomania: no  Hopelessness/helplessness/worthlessness: yes, sometimes feels helpless with the emotional pain. Self-injurious behavior/high-risk behavior: no  Suicidal ideation: no  Homicidal ideation: no  Auditory hallucinations: no  Visual hallucinations: no  Other perceptual disturbances: no  Delusional thinking: no  Obsessive/compulsive symptoms: no    Review Of Systems:      Constitutional negative   ENT negative   Cardiovascular negative   Respiratory negative   Gastrointestinal negative   Genitourinary negative   Musculoskeletal negative   Integumentary negative   Neurological negative   Endocrine negative   Other Symptoms none, all other systems are negative     History Review: The following portions of the patient's history were reviewed and updated as appropriate: allergies, current medications, past family history, past medical history, past social history, past surgical history and problem list..         OBJECTIVE:     Vital signs in last 24 hours: There were no vitals filed for this visit.     Mental Status Evaluation:    Appearance age appropriate, casually dressed   Behavior cooperative, calm Speech normal rate, normal volume, normal pitch   Mood slightly less dysphoric   Affect tearful, but ramsay range   Thought Processes organized, goal directed   Associations intact associations, less perseverative on her son's death   Thought Content no overt delusions   Perceptual Disturbances: no auditory hallucinations, no visual hallucinations   Abnormal Thoughts  Risk Potential Suicidal ideation - None  Homicidal ideation - None  Potential for aggression - No   Orientation oriented to person, place, time/date and situation   Memory recent and remote memory grossly intact   Consciousness alert and awake   Attention Span Concentration Span attention span and concentration are age appropriate   Intellect appears to be of average intelligence   Insight intact   Judgement intact   Muscle Strength and  Gait normal muscle strength and normal muscle tone, normal gait and normal balance   Motor activity no abnormal movements   Fund of Knowledge adequate knowledge of current events  adequate fund of knowledge regarding past history  adequate fund of knowledge regarding vocabulary    Pain none   Pain Scale 0       Laboratory Results: I have personally reviewed all pertinent laboratory/tests results    Recent Labs (last 2 months):   No visits with results within 2 Month(s) from this visit.    Latest known visit with results is:   Appointment on 03/18/2023   Component Date Value   • ABO Grouping 03/18/2023 B    • Rh Factor 03/18/2023 Positive    • Antibody Screen 03/18/2023 Negative    • Specimen Expiration Date 03/18/2023 [id number]    • Urine Culture 03/18/2023 No Growth <1000 cfu/mL    • WBC 03/18/2023 6.76    • RBC 03/18/2023 4.28    • Hemoglobin 03/18/2023 13.0    • Hematocrit 03/18/2023 38.8    • MCV 03/18/2023 91    • MCH 03/18/2023 30.4    • MCHC 03/18/2023 33.5    • RDW 03/18/2023 13.7    • MPV 03/18/2023 11.0    • Platelets 50/20/7577 225    • nRBC 03/18/2023 0    • Neutrophils Relative 03/18/2023 72    • Immat GRANS % 03/18/2023 0    • Lymphocytes Relative 03/18/2023 23    • Monocytes Relative 03/18/2023 4    • Eosinophils Relative 03/18/2023 1    • Basophils Relative 03/18/2023 0    • Neutrophils Absolute 03/18/2023 4.84    • Immature Grans Absolute 03/18/2023 0.02    • Lymphocytes Absolute 03/18/2023 1.58    • Monocytes Absolute 03/18/2023 0.27    • Eosinophils Absolute 03/18/2023 0.04    • Basophils Absolute 03/18/2023 0.01    • Rubella IgG Quant 03/18/2023 109.6    • Hep B S Ab 03/18/2023 5.06    • Hepatitis B Surface Ag 03/18/2023 Non-reactive    • Hepatitis C Ab 03/18/2023 Non-reactive    • HIV-1 p24 Antigen 03/18/2023 Non-Reactive    • HIV-1 Antibody 03/18/2023 Non-Reactive    • HIV-2 Antibody 03/18/2023 Non-Reactive    • HIV Ag-Ab 5th Gen 03/18/2023 Non-Reactive    • Syphilis Total Antibody 03/18/2023 Non-reactive        Suicide/Homicide Risk Assessment:    The following interventions are recommended: contracts for safety at present - agrees to go to ED if feeling unsafe, contracts for safety at present - agrees to call Crisis Intervention Service if feeling unsafe. Although patient's acute lethality risk is low, long-term/chronic lethality risk is mildly elevated in the presence of depression. At the current moment, Carlos Meadows is future-oriented, forward-thinking, and demonstrates ability to act in a self-preserving manner as evidenced by volitionally presenting to the clinic today, seeking treatment. To mitigate future risk, patient should adhere to the recommendations below, avoid alcohol/illicit substance use, utilize community-based resources and familiar support and prioritize mental health treatment. Presently, patient denies active suicidal/homicidal ideation in addition to thoughts of self-injury; contracts for safety. At conclusion of evaluation, patient is amenable to the recommendations below.  Patient is amenable to calling/contacting the outpatient office including this writer if any acute adverse effects of their medication regimen arise in addition to any comments or concerns pertaining to their psychiatric management. Patient is amenable to calling/contacting crisis and/or attending to the nearest emergency department if their clinical condition deteriorates to assure their safety and stability, stating that they are able to appropriately confide in their  regarding their psychiatric state. Assessment/Plan:     Diagnoses and all orders for this visit:    Postpartum depression  -     buPROPion (Wellbutrin SR) 150 mg 12 hr tablet; Take 1 tablet (150 mg total) by mouth in the morning    Acute stress disorder  -     traZODone (DESYREL) 100 mg tablet; Take 1 tablet (100 mg total) by mouth daily at bedtime    Sonya Mars is a 26year old female who is struggling with post-partum depression. She had some depression and anxiety in the past, but symptoms have been made significantly worse by the death of her son in utero. She is still struggling with some nightmares and flashbacks related to the incident, but improving somewhat as she has been better able to distract herself with work. Wellbutrin seems to have helped her symptoms somewhat, may be experiencing some insomnia related to the medication. She seems to be making progress, has a lot of support, and does not appear to be in acute danger to herself or others at this time. Treatment Recommendations/Precautions:    • Will change Wellbutrin from 150mg XL to 150mg SR, to see if this improves insomnia. • Will increase trazodone to 100 mg nightly. • Medication management every 4 weeks  • Continue psychotherapy with own therapist  • Aware of 24 hour and weekend coverage for urgent situations accessed by calling [facility] practice number  Patient advised to call 911 if feeling suicidal or homicidal before acting out on their thoughts and they expressed understanding.   Medications Risks/Benefits      Risks, Benefits And Possible Side Effects Of Medications:    Risks, benefits, and possible side effects of medications explained to [facility] and she verbalizes understanding and agreement for treatment. including: Risks and potential side effects of medication discussed with patient including serotonin syndrome, SIADH, worsening depression, suicidality, induction of zay, GI upset, headaches, activation, sexual side effects, sedation, potential drug interactions, and others. Patient expressed understanding. .     Controlled Medication Discussion:     Not applicable    Psychotherapy Provided:     Individual psychotherapy provided: Yes  Counseling was provided during the session today for 16 minutes. Medication education provided to [facility]. Recent stressor including family conflict and death of unborn son, returning to work discussed with [facility]. Coping techniques including increasing social contact, keeping busy at home, reducing negative automatic thoughts and talking to a therapist reviewed with [facility]. Reassurance and supportive therapy provided.         Treatment Plan:    Completed and signed during the session: Not applicable - Treatment Plan not due at this session    This note was not shared with the patient due to this is a psychotherapy note      Karla Salazar DO 09/12/23

## 2023-10-05 ENCOUNTER — OFFICE VISIT (OUTPATIENT)
Dept: PSYCHIATRY | Facility: CLINIC | Age: 33
End: 2023-10-05
Payer: COMMERCIAL

## 2023-10-05 DIAGNOSIS — F41.0 PANIC ATTACKS: ICD-10-CM

## 2023-10-05 PROCEDURE — 99214 OFFICE O/P EST MOD 30 MIN: CPT | Performed by: STUDENT IN AN ORGANIZED HEALTH CARE EDUCATION/TRAINING PROGRAM

## 2023-10-05 PROCEDURE — 90833 PSYTX W PT W E/M 30 MIN: CPT | Performed by: STUDENT IN AN ORGANIZED HEALTH CARE EDUCATION/TRAINING PROGRAM

## 2023-10-05 RX ORDER — ESCITALOPRAM OXALATE 5 MG/1
5 TABLET ORAL DAILY
Qty: 30 TABLET | Refills: 1 | Status: SHIPPED | OUTPATIENT
Start: 2023-10-05

## 2023-10-06 NOTE — PSYCH
MEDICATION MANAGEMENT NOTE        [facility]      Name and Date of Birth:  Garrett Wellington 26 y.o. 1990 MRN: [id number]    Date of Visit: October 5, 2023  Visit Time    Visit Start Time: 7391  Visit Stop Time: 0860  Total Visit Duration: 30 minutes      Reason for Visit: Follow-up visit regarding medication management     Chief Complaint: "I feel like I'm having more panic attacks."    SUBJECTIVE:    Garrett Wellington is a 26 y.o., female, possessing a past psychiatric history significant for anxiety, who recently experienced significant stress following the in-utero fetal demise and having to deliver still born baby at 10 months in June 2023, who was personally seen and evaluated at the [address] [place] [address] outpatient clinic for follow-up regarding medication management. Loyda Barnes is currently prescribed Wellbutrin  mg daily, and trazodone 100mg nightly. During interview today, states that she is still struggling with anxiety and depression related to her unborn son. States that she does feel things are improving somewhat, she is spending less of her time crying and has been distracted by work. States that she has been eating well and trying to get out, has gone to the grave. However, has not gone into his bedroom yet. States that she still struggles with panic attacks, described as feeling anxious, shaky, short of breath, and an overall feeling of numbness or coldness. States that these happen randomly throughout the day. States that she would be interested in trying another antidepressant, as she had only been on Zoloft previously but did not feel it was helping anymore. Denies thoughts to herself or anyone else, but states she still has passive suicidal thoughts at times as she does not want to continue experiencing this emotional pain. Concedes that she is getting along well with her , they have had unprotected sex a few times.   She states that she does end up crying after having sex, but sometimes wonders if it will end up in her getting pregnant. States that she does want to have a child so that she and her  can feel like a whole family, as he does not have children of his own. States that she is getting along well with her daughter. Current Rating Scores:   None completed today. Psychiatric Review Of Systems:    Appetite: increased  Adverse eating: no  Weight changes: no  Insomnia/sleeplessness: yes  Fatigue/anergy: no, improving  Anhedonia/lack of interest: some days  Attention/concentration: no change  Psychomotor agitation/retardation: no  Somatic symptoms: no  Anxiety/panic attack: panic attacks, worrying  Ana María/hypomania: no  Hopelessness/helplessness/worthlessness: yes, sometimes feels helpless with the emotional pain. Self-injurious behavior/high-risk behavior: no  Suicidal ideation: no  Homicidal ideation: no  Auditory hallucinations: no  Visual hallucinations: no  Other perceptual disturbances: no  Delusional thinking: no  Obsessive/compulsive symptoms: no    Review Of Systems:      Constitutional negative   ENT negative   Cardiovascular negative   Respiratory negative   Gastrointestinal negative   Genitourinary negative   Musculoskeletal negative   Integumentary negative   Neurological negative   Endocrine negative   Other Symptoms none, all other systems are negative     History Review: The following portions of the patient's history were reviewed and updated as appropriate: allergies, current medications, past family history, past medical history, past social history, past surgical history and problem list..         OBJECTIVE:     Vital signs in last 24 hours: There were no vitals filed for this visit.     Mental Status Evaluation:    Appearance age appropriate, casually dressed   Behavior cooperative, calm   Speech normal rate, normal volume, normal pitch   Mood slightly less dysphoric   Affect tearful, but ramsay range   Thought Processes organized, goal directed   Associations intact associations, less perseverative on her son's death   Thought Content no overt delusions   Perceptual Disturbances: no auditory hallucinations, no visual hallucinations   Abnormal Thoughts  Risk Potential Suicidal ideation - None  Homicidal ideation - None  Potential for aggression - No   Orientation oriented to person, place, time/date and situation   Memory recent and remote memory grossly intact   Consciousness alert and awake   Attention Span Concentration Span attention span and concentration are age appropriate   Intellect appears to be of average intelligence   Insight intact   Judgement intact   Muscle Strength and  Gait normal muscle strength and normal muscle tone, normal gait and normal balance   Motor activity no abnormal movements   Fund of Knowledge adequate knowledge of current events  adequate fund of knowledge regarding past history  adequate fund of knowledge regarding vocabulary    Pain none   Pain Scale 0       Laboratory Results: I have personally reviewed all pertinent laboratory/tests results    Recent Labs (last 2 months):   No visits with results within 2 Month(s) from this visit.    Latest known visit with results is:   Appointment on 03/18/2023   Component Date Value   • ABO Grouping 03/18/2023 B    • Rh Factor 03/18/2023 Positive    • Antibody Screen 03/18/2023 Negative    • Specimen Expiration Date 03/18/2023 [id number]    • Urine Culture 03/18/2023 No Growth <1000 cfu/mL    • WBC 03/18/2023 6.76    • RBC 03/18/2023 4.28    • Hemoglobin 03/18/2023 13.0    • Hematocrit 03/18/2023 38.8    • MCV 03/18/2023 91    • MCH 03/18/2023 30.4    • MCHC 03/18/2023 33.5    • RDW 03/18/2023 13.7    • MPV 03/18/2023 11.0    • Platelets 68/00/0883 225    • nRBC 03/18/2023 0    • Neutrophils Relative 03/18/2023 72    • Immat GRANS % 03/18/2023 0    • Lymphocytes Relative 03/18/2023 23    • Monocytes Relative 03/18/2023 4    • Eosinophils Relative 03/18/2023 1    • Basophils Relative 03/18/2023 0    • Neutrophils Absolute 03/18/2023 4.84    • Immature Grans Absolute 03/18/2023 0.02    • Lymphocytes Absolute 03/18/2023 1.58    • Monocytes Absolute 03/18/2023 0.27    • Eosinophils Absolute 03/18/2023 0.04    • Basophils Absolute 03/18/2023 0.01    • Rubella IgG Quant 03/18/2023 109.6    • Hep B S Ab 03/18/2023 5.06    • Hepatitis B Surface Ag 03/18/2023 Non-reactive    • Hepatitis C Ab 03/18/2023 Non-reactive    • HIV-1 p24 Antigen 03/18/2023 Non-Reactive    • HIV-1 Antibody 03/18/2023 Non-Reactive    • HIV-2 Antibody 03/18/2023 Non-Reactive    • HIV Ag-Ab 5th Gen 03/18/2023 Non-Reactive    • Syphilis Total Antibody 03/18/2023 Non-reactive        Suicide/Homicide Risk Assessment:    The following interventions are recommended: contracts for safety at present - agrees to go to ED if feeling unsafe, contracts for safety at present - agrees to call Crisis Intervention Service if feeling unsafe. Although patient's acute lethality risk is low, long-term/chronic lethality risk is mildly elevated in the presence of depression. At the current moment, Moraima Crystal is future-oriented, forward-thinking, and demonstrates ability to act in a self-preserving manner as evidenced by volitionally presenting to the clinic today, seeking treatment. To mitigate future risk, patient should adhere to the recommendations below, avoid alcohol/illicit substance use, utilize community-based resources and familiar support and prioritize mental health treatment. Presently, patient denies active suicidal/homicidal ideation in addition to thoughts of self-injury; contracts for safety. At conclusion of evaluation, patient is amenable to the recommendations below.  Patient is amenable to calling/contacting the outpatient office including this writer if any acute adverse effects of their medication regimen arise in addition to any comments or concerns pertaining to their psychiatric management. Patient is amenable to calling/contacting crisis and/or attending to the nearest emergency department if their clinical condition deteriorates to assure their safety and stability, stating that they are able to appropriately confide in their  regarding their psychiatric state. Assessment/Plan:     Diagnoses and all orders for this visit:    Postpartum depression  -     escitalopram (LEXAPRO) 5 mg tablet; Take 1 tablet (5 mg total) by mouth daily    Panic attacks    Jasmin Henderson is a 26year old female who is struggling with post-partum depression. She had some depression and anxiety in the past, but symptoms have been made significantly worse by the death of her son in utero. She is still struggling with some nightmares and flashbacks related to the incident, but improving somewhat as she has been better able to distract herself with work. Wellbutrin seems to have helped her symptoms somewhat, may be experiencing some insomnia related to the medication. She seems to be making progress, has a lot of support, and does not appear to be in acute danger to herself or others at this time. Treatment Recommendations/Precautions:    • Will start Lexapro 5 mg daily for treatment of her depression and panic attacks. • Will continue with Wellbutrin  mg daily. • Medication management every 4 weeks  • Continue psychotherapy with own therapist  • Aware of 24 hour and weekend coverage for urgent situations accessed by calling [facility] practice number  Patient advised to call 911 if feeling suicidal or homicidal before acting out on their thoughts and they expressed understanding. Medications Risks/Benefits      Risks, Benefits And Possible Side Effects Of Medications:    Risks, benefits, and possible side effects of medications explained to Jasmin Henderson and she verbalizes understanding and agreement for treatment.  including: Risks and potential side effects of medication discussed with patient including serotonin syndrome, SIADH, worsening depression, suicidality, induction of zay, GI upset, headaches, activation, sexual side effects, sedation, potential drug interactions, and others. Patient expressed understanding. .     Controlled Medication Discussion:     Not applicable    Psychotherapy Provided:     Individual psychotherapy provided: Yes  Counseling was provided during the session today for 16 minutes. Medication education provided to RAVINDERLINDA. Goals discussed during in session: continue improvment in grief. .   Discussed with MINA coping with social difficulties and ongoing anxiety. Coping strategies including getting into a good routine, increasing social interaction, meditation, spending time with family and spending time with friends reviewed with MINA. Reassurance and supportive therapy provided.           Treatment Plan:    Completed and signed during the session: Not applicable - Treatment Plan not due at this session    This note was not shared with the patient due to this is a psychotherapy note      Jj Black DO 10/06/23

## 2023-10-31 ENCOUNTER — SOCIAL WORK (OUTPATIENT)
Dept: BEHAVIORAL/MENTAL HEALTH CLINIC | Facility: CLINIC | Age: 33
End: 2023-10-31
Payer: COMMERCIAL

## 2023-10-31 DIAGNOSIS — F32.1 CURRENT MODERATE EPISODE OF MAJOR DEPRESSIVE DISORDER WITHOUT PRIOR EPISODE (HCC): Primary | ICD-10-CM

## 2023-10-31 DIAGNOSIS — F43.10 PTSD (POST-TRAUMATIC STRESS DISORDER): ICD-10-CM

## 2023-10-31 PROCEDURE — 90832 PSYTX W PT 30 MINUTES: CPT | Performed by: SOCIAL WORKER

## 2023-10-31 NOTE — PSYCH
Assessment/Plan:      Diagnoses and all orders for this visit:    Current moderate episode of major depressive disorder without prior episode ([address])    PTSD (post-traumatic stress disorder)          Subjective: Pt presented for follow up therapy session. Pt reported that she's been emotional the last few days with Halloween and not having her son here. Her sleep and anxiety have worsened recently. She was started on Lexapro at the beginning of the month. Pt still has thoughts that she'd rather not be here but will not act on this. Pt's daughter is doing well in school. Work is busy which is a good thing but evenings are when she struggles the most.  Pt continues to get together with friends and gets out of the house on weekends. Processed pt's feelings and offered additional suggestions for coping. Patient ID: Char Latif is a 26 y.o. female. HPI    Review of Systems      Objective:     Physical Exam      Behavioral Health Psychotherapy Progress Note    Psychotherapy Provided: Individual Psychotherapy     1. Current moderate episode of major depressive disorder without prior episode ([address])        2. PTSD (post-traumatic stress disorder)            Goals addressed in session: Goal 1     DATA:   During this session, this clinician used the following therapeutic modalities: Engagement Strategies, Bereavement Therapy, Client-centered Therapy, Mindfulness-based Strategies, and Supportive Psychotherapy    Substance Abuse was not addressed during this session. If the client is diagnosed with a co-occurring substance use disorder, please indicate any changes in the frequency or amount of use: na. Stage of change for addressing substance use diagnoses: No substance use/Not applicable    ASSESSMENT:  Char Latif presents with a Depressed mood. her affect is Tearful, which is congruent, with her mood and the content of the session. The client has made progress on their goals.      Char Latif presents with a minimal risk of suicide, minimal risk of self-harm, and none risk of harm to others. For any risk assessment that surpasses a "low" rating, a safety plan must be developed. A safety plan was indicated: no  If yes, describe in detail na    PLAN: Between sessions, Teja Dunbar will continue to utilize coping skills. At the next session, the therapist will use Engagement Strategies, Bereavement Therapy, Client-centered Therapy, Mindfulness-based Strategies, and Supportive Psychotherapy to address depression. Behavioral Health Treatment Plan and Discharge Planning: Teja Dunbar is aware of and agrees to continue to work on their treatment plan. They have identified and are working toward their discharge goals.  yes    Visit start and stop times:    10/31/23  Start Time: 1600  Stop Time: 1626  Total Visit Time: 26 minutes

## 2023-11-02 ENCOUNTER — OFFICE VISIT (OUTPATIENT)
Dept: PSYCHIATRY | Facility: CLINIC | Age: 33
End: 2023-11-02
Payer: COMMERCIAL

## 2023-11-02 DIAGNOSIS — F41.0 PANIC ATTACKS: ICD-10-CM

## 2023-11-02 PROCEDURE — 99214 OFFICE O/P EST MOD 30 MIN: CPT | Performed by: STUDENT IN AN ORGANIZED HEALTH CARE EDUCATION/TRAINING PROGRAM

## 2023-11-02 PROCEDURE — 90833 PSYTX W PT W E/M 30 MIN: CPT | Performed by: STUDENT IN AN ORGANIZED HEALTH CARE EDUCATION/TRAINING PROGRAM

## 2023-11-02 RX ORDER — ESCITALOPRAM OXALATE 10 MG/1
10 TABLET ORAL DAILY
Qty: 30 TABLET | Refills: 2 | Status: SHIPPED | OUTPATIENT
Start: 2023-11-02

## 2023-11-02 RX ORDER — MIRTAZAPINE 7.5 MG/1
7.5 TABLET, FILM COATED ORAL
Qty: 30 TABLET | Refills: 1 | Status: SHIPPED | OUTPATIENT
Start: 2023-11-02

## 2023-11-03 NOTE — PSYCH
MEDICATION MANAGEMENT NOTE        [facility]      Name and Date of Birth:  Garrett Wellington 26 y.o. 1990 MRN: [id number]    Date of Visit: November 2, 2023  Visit Time    Visit Start Time: 9675  Visit Stop Time: 1630  Total Visit Duration:  35 minutes      Reason for Visit: Follow-up visit regarding medication management     Chief Complaint: "I feel like I'm not sleeping well."    SUBJECTIVE:    Garrett Wellington is a 26 y.o., female, possessing a past psychiatric history significant for anxiety, who recently experienced significant stress following the in-utero fetal demise and having to deliver still born baby at 10 months in June 2023, who was personally seen and evaluated at the [address] [place] [address] outpatient clinic for follow-up regarding medication management. Loyda Barnes is currently prescribed Wellbutrin  mg daily, and trazodone 100mg nightly. Started on Lexapro 5mg daily at last appointment. During interview today, Loyda Barnes states she is feeling "not that great". States she is sleeping poorly, admits she is sleeping with her seven year old daughter at night. States she "feels bad" she was absent while mourning the loss of her unborn son. States she still thinks about him, and is anticipating struggling during the holidays, thinking "he should have been here". States she ended up leaving a Halloween party because there were babies there and it was too triggering for her. Admits she feels her panic attacks are improved with lexapro, but she is not sleeping well and making her feel more emotionally sensitive. States her  is supportive, and they have continued to be intimate. States she recalls feeling very anxious during her pregnancy like she couldn't enjoy it because of her concerns about the baby's health, "he had a lot of complications".  She admitted she felt at times she may have "wished" him away, but she did end up getting excited after her baby shower. States she has continued to wonder why this happened to her. States her family is helpful, and work is a good distraction. Current Rating Scores:   None completed today. Psychiatric Review Of Systems:    Appetite: decreased  Adverse eating: no  Weight changes: no  Insomnia/sleeplessness: yes  Fatigue/anergy: yes  Anhedonia/lack of interest:  some days  Attention/concentration: no change  Psychomotor agitation/retardation: no  Somatic symptoms: no  Anxiety/panic attack: panic attacks, worrying  Ana María/hypomania: no  Hopelessness/helplessness/worthlessness: yes, sometimes feels helpless with the emotional pain. Self-injurious behavior/high-risk behavior: no  Suicidal ideation: no  Homicidal ideation: no  Auditory hallucinations: no  Visual hallucinations: no  Other perceptual disturbances: no  Delusional thinking: no  Obsessive/compulsive symptoms: no    Review Of Systems:      Constitutional negative   ENT negative   Cardiovascular negative   Respiratory negative   Gastrointestinal negative   Genitourinary negative   Musculoskeletal negative   Integumentary negative   Neurological negative   Endocrine negative   Other Symptoms none, all other systems are negative     History Review: The following portions of the patient's history were reviewed and updated as appropriate: allergies, current medications, past family history, past medical history, past social history, past surgical history and problem list..         OBJECTIVE:     Vital signs in last 24 hours: There were no vitals filed for this visit.     Mental Status Evaluation:    Appearance age appropriate, casually dressed   Behavior cooperative, appears anxious   Speech normal rate, normal volume, normal pitch   Mood still at times anxious, continues to feel dysphoric   Affect tearful, but ramsay range   Thought Processes organized, goal directed   Associations intact associations, less perseverative on her son's death   Thought Content no overt delusions   Perceptual Disturbances: no auditory hallucinations, no visual hallucinations   Abnormal Thoughts  Risk Potential Suicidal ideation - None  Homicidal ideation - None  Potential for aggression - No   Orientation oriented to person, place, time/date and situation   Memory recent and remote memory grossly intact   Consciousness alert and awake   Attention Span Concentration Span attention span and concentration are age appropriate   Intellect appears to be of average intelligence   Insight intact   Judgement intact   Muscle Strength and  Gait normal muscle strength and normal muscle tone, normal gait and normal balance   Motor activity no abnormal movements   Fund of Knowledge adequate knowledge of current events  adequate fund of knowledge regarding past history  adequate fund of knowledge regarding vocabulary    Pain none   Pain Scale 0       Laboratory Results: I have personally reviewed all pertinent laboratory/tests results    Recent Labs (last 2 months):   No visits with results within 2 Month(s) from this visit.    Latest known visit with results is:   Appointment on 03/18/2023   Component Date Value    ABO Grouping 03/18/2023 B     Rh Factor 03/18/2023 Positive     Antibody Screen 03/18/2023 Negative     Specimen Expiration Date 03/18/2023 [id number]     Urine Culture 03/18/2023 No Growth <1000 cfu/mL     WBC 03/18/2023 6.76     RBC 03/18/2023 4.28     Hemoglobin 03/18/2023 13.0     Hematocrit 03/18/2023 38.8     MCV 03/18/2023 91     MCH 03/18/2023 30.4     MCHC 03/18/2023 33.5     RDW 03/18/2023 13.7     MPV 03/18/2023 11.0     Platelets 73/21/0216 225     nRBC 03/18/2023 0     Neutrophils Relative 03/18/2023 72     Immat GRANS % 03/18/2023 0     Lymphocytes Relative 03/18/2023 23     Monocytes Relative 03/18/2023 4     Eosinophils Relative 03/18/2023 1     Basophils Relative 03/18/2023 0     Neutrophils Absolute 03/18/2023 4.84     Immature Grans Absolute 03/18/2023 0.02 Lymphocytes Absolute 03/18/2023 1.58     Monocytes Absolute 03/18/2023 0.27     Eosinophils Absolute 03/18/2023 0.04     Basophils Absolute 03/18/2023 0.01     Rubella IgG Quant 03/18/2023 109.6     Hep B S Ab 03/18/2023 5.06     Hepatitis B Surface Ag 03/18/2023 Non-reactive     Hepatitis C Ab 03/18/2023 Non-reactive     HIV-1 p24 Antigen 03/18/2023 Non-Reactive     HIV-1 Antibody 03/18/2023 Non-Reactive     HIV-2 Antibody 03/18/2023 Non-Reactive     HIV Ag-Ab 5th Gen 03/18/2023 Non-Reactive     Syphilis Total Antibody 03/18/2023 Non-reactive        Suicide/Homicide Risk Assessment:    The following interventions are recommended: contracts for safety at present - agrees to go to ED if feeling unsafe, contracts for safety at present - agrees to call Crisis Intervention Service if feeling unsafe. Although patient's acute lethality risk is low, long-term/chronic lethality risk is mildly elevated in the presence of depression. At the current moment, Loyda Barnes is future-oriented, forward-thinking, and demonstrates ability to act in a self-preserving manner as evidenced by volitionally presenting to the clinic today, seeking treatment. To mitigate future risk, patient should adhere to the recommendations below, avoid alcohol/illicit substance use, utilize community-based resources and familiar support and prioritize mental health treatment. Presently, patient denies active suicidal/homicidal ideation in addition to thoughts of self-injury; contracts for safety. At conclusion of evaluation, patient is amenable to the recommendations below. Patient is amenable to calling/contacting the outpatient office including this writer if any acute adverse effects of their medication regimen arise in addition to any comments or concerns pertaining to their psychiatric management.   Patient is amenable to calling/contacting crisis and/or attending to the nearest emergency department if their clinical condition deteriorates to assure their safety and stability, stating that they are able to appropriately confide in their  regarding their psychiatric state. Assessment/Plan:     Diagnoses and all orders for this visit:    Postpartum depression  -     escitalopram (LEXAPRO) 10 mg tablet; Take 1 tablet (10 mg total) by mouth daily  -     mirtazapine (REMERON) 7.5 MG tablet; Take 1 tablet (7.5 mg total) by mouth daily at bedtime    Panic attacks    Dwayne Schulz is a 26year old female who is struggling with post-partum depression. She had some depression and anxiety in the past, but symptoms have been made significantly worse by the death of her son in utero. She is still struggling with some nightmares and flashbacks related to the incident, but improving somewhat as she has been better able to distract herself with work. Wellbutrin seems to have helped her symptoms somewhat, may be experiencing some insomnia related to the medication. She seems to be making progress, has a lot of support, and does not appear to be in acute danger to herself or others at this time. Treatment Recommendations/Precautions: Will increase lexapro to 10mg daily for treatment of her depression and anxiety. Will d/c trazodone and start mirtazapine 7.5mg qHS for treatment of her insomnia and anxiety. Will continue with Wellbutrin  mg daily. Medication management every 4 weeks  Continue psychotherapy with own therapist  Aware of 24 hour and weekend coverage for urgent situations accessed by calling [facility] practice number  Patient advised to call 911 if feeling suicidal or homicidal before acting out on their thoughts and they expressed understanding. Medications Risks/Benefits      Risks, Benefits And Possible Side Effects Of Medications:    Risks, benefits, and possible side effects of medications explained to Dwayne Schulz and she verbalizes understanding and agreement for treatment.  including: Risks and potential side effects of medication discussed with patient including serotonin syndrome, SIADH, worsening depression, suicidality, induction of zay, GI upset, headaches, activation, sexual side effects, sedation, potential drug interactions, and others. Patient expressed understanding. .     Controlled Medication Discussion:     Not applicable    Psychotherapy Provided:     Individual psychotherapy provided: Yes  Counseling was provided during the session today for 18 minutes. Medication education provided to MINA. Discussed with MINA coping with death of son, social difficulties, and sleep issues . Coping techniques including cognitive restructuring, getting into a good routine, make a gratitude list, making a list of future goals, meditation, mindfulness, talking to family, and scheduling bereavement  reviewed with MINA. Reassurance and supportive therapy provided.           Treatment Plan:    Completed and signed during the session: Not applicable - Treatment Plan not due at this session    This note was not shared with the patient due to this is a psychotherapy note      Devonte Browne DO 11/03/23

## 2023-11-20 ENCOUNTER — OFFICE VISIT (OUTPATIENT)
Dept: PSYCHIATRY | Facility: CLINIC | Age: 33
End: 2023-11-20
Payer: COMMERCIAL

## 2023-11-20 DIAGNOSIS — F41.0 PANIC ATTACKS: ICD-10-CM

## 2023-11-20 DIAGNOSIS — F43.21 GRIEF ASSOCIATED WITH LOSS OF FETUS: ICD-10-CM

## 2023-11-20 PROCEDURE — 90833 PSYTX W PT W E/M 30 MIN: CPT | Performed by: STUDENT IN AN ORGANIZED HEALTH CARE EDUCATION/TRAINING PROGRAM

## 2023-11-20 PROCEDURE — 99214 OFFICE O/P EST MOD 30 MIN: CPT | Performed by: STUDENT IN AN ORGANIZED HEALTH CARE EDUCATION/TRAINING PROGRAM

## 2023-11-20 RX ORDER — ESCITALOPRAM OXALATE 5 MG/1
5 TABLET ORAL DAILY
Qty: 30 TABLET | Refills: 1 | Status: SHIPPED | OUTPATIENT
Start: 2023-11-20

## 2023-11-20 RX ORDER — CLONAZEPAM 0.5 MG/1
0.5 TABLET ORAL
Qty: 14 TABLET | Refills: 0 | Status: SHIPPED | OUTPATIENT
Start: 2023-11-20

## 2023-11-22 NOTE — PSYCH
MEDICATION MANAGEMENT NOTE        [facility] [facility]      Name and Date of Birth:  Char Latif 26 y.o. 1990 MRN: [id number]    Date of Visit: 2023  Visit Time    Visit Start Time:   Visit Stop Time:   Total Visit Duration:  40 minutes      Reason for Visit: Follow-up visit regarding medication management     Chief Complaint: "I feel like I've been feeling worse."    SUBJECTIVE:    Char Latif is a 26 y.o., female, possessing a past psychiatric history significant for anxiety, who recently experienced significant stress following the in-utero fetal demise and having to deliver still born baby at 10 months in 2023, who was personally seen and evaluated at the [address] [place] [address] outpatient clinic for follow-up regarding medication management. Hussain Iverson is currently prescribed Wellbutrin  mg daily. .  Started on Lexapro 10mg daily at last appointment, and started on mirtazapine 7.5mg qHS. During interview today, Hussain Iverson states she is feeling "really bad". States that since she has been on mirtazapine, she has been having worsening suicidal thoughts. She states that she has been feeling more hopeless, and overwhelmed. She states that she has been struggling with recurrent thoughts about her  fetus, and continues asking why it happened and feeling that life is unfair. States that she feels that it is unfair that she lost her baby when other women may have multiple pregnancies but lose her baby due to CYS involvement. She states that she feels sad and overwhelmed, and struggles with sadness most days. She states that she does find work distracts her, but she finds herself feeling more irritable and sad when she is home. She admits that she still has not open the room where her baby would have stayed, and there are gaps in there.   She states she does not want to get rid of them in case she and her  you end up getting pregnant and having another baby. She states that she does feel safe right now, and she would never harm herself, but she states that the thoughts have become more present since she has been on the mirtazapine. We discussed discontinuing this medication and using Klonopin for now for her sleep to help her feel more relaxed at night and reduce her anxious thoughts. She states that she continues with therapy and finds it somewhat helpful. She states that she feels the mirtazapine may have "put me back at square 1". She states she is looking forward to the holidays, and appreciates the support she has from her family. Current Rating Scores:   None completed today. Psychiatric Review Of Systems:    Appetite: decreased  Adverse eating: no  Weight changes: no  Insomnia/sleeplessness: yes  Fatigue/anergy: yes  Anhedonia/lack of interest:  some days  Attention/concentration: no change  Psychomotor agitation/retardation: no  Somatic symptoms: no  Anxiety/panic attack: panic attacks, worrying  Ana María/hypomania: no  Hopelessness/helplessness/worthlessness: yes, sometimes feels helpless with the emotional pain. Self-injurious behavior/high-risk behavior: no  Suicidal ideation: no  Homicidal ideation: no  Auditory hallucinations: no  Visual hallucinations: no  Other perceptual disturbances: no  Delusional thinking: no  Obsessive/compulsive symptoms: no    Review Of Systems:      Constitutional negative   ENT negative   Cardiovascular negative   Respiratory negative   Gastrointestinal negative   Genitourinary negative   Musculoskeletal negative   Integumentary negative   Neurological negative   Endocrine negative   Other Symptoms none, all other systems are negative     History Review:    The following portions of the patient's history were reviewed and updated as appropriate: allergies, current medications, past family history, past medical history, past social history, past surgical history and problem list.. OBJECTIVE:     Vital signs in last 24 hours: There were no vitals filed for this visit. Mental Status Evaluation:    Appearance age appropriate, casually dressed   Behavior cooperative, appears anxious   Speech normal rate, normal volume, normal pitch   Mood still at times anxious, continues to feel dysphoric   Affect tearful, but ramsay range   Thought Processes organized, goal directed   Associations intact associations, less perseverative on her son's death   Thought Content no overt delusions   Perceptual Disturbances: no auditory hallucinations, no visual hallucinations   Abnormal Thoughts  Risk Potential Suicidal ideation - None  Homicidal ideation - None  Potential for aggression - No   Orientation oriented to person, place, time/date and situation   Memory recent and remote memory grossly intact   Consciousness alert and awake   Attention Span Concentration Span attention span and concentration are age appropriate   Intellect appears to be of average intelligence   Insight intact   Judgement intact   Muscle Strength and  Gait normal muscle strength and normal muscle tone, normal gait and normal balance   Motor activity no abnormal movements   Fund of Knowledge adequate knowledge of current events  adequate fund of knowledge regarding past history  adequate fund of knowledge regarding vocabulary    Pain none   Pain Scale 0       Laboratory Results: I have personally reviewed all pertinent laboratory/tests results    Recent Labs (last 2 months):   No visits with results within 2 Month(s) from this visit.    Latest known visit with results is:   Appointment on 03/18/2023   Component Date Value    ABO Grouping 03/18/2023 B     Rh Factor 03/18/2023 Positive     Antibody Screen 03/18/2023 Negative     Specimen Expiration Date 03/18/2023 [id number]     Urine Culture 03/18/2023 No Growth <1000 cfu/mL     WBC 03/18/2023 6.76     RBC 03/18/2023 4.28     Hemoglobin 03/18/2023 13.0     Hematocrit 03/18/2023 38.8     MCV 03/18/2023 91     MCH 03/18/2023 30.4     MCHC 03/18/2023 33.5     RDW 03/18/2023 13.7     MPV 03/18/2023 11.0     Platelets 47/55/9462 225     nRBC 03/18/2023 0     Neutrophils Relative 03/18/2023 72     Immat GRANS % 03/18/2023 0     Lymphocytes Relative 03/18/2023 23     Monocytes Relative 03/18/2023 4     Eosinophils Relative 03/18/2023 1     Basophils Relative 03/18/2023 0     Neutrophils Absolute 03/18/2023 4.84     Immature Grans Absolute 03/18/2023 0.02     Lymphocytes Absolute 03/18/2023 1.58     Monocytes Absolute 03/18/2023 0.27     Eosinophils Absolute 03/18/2023 0.04     Basophils Absolute 03/18/2023 0.01     Rubella IgG Quant 03/18/2023 109.6     Hep B S Ab 03/18/2023 5.06     Hepatitis B Surface Ag 03/18/2023 Non-reactive     Hepatitis C Ab 03/18/2023 Non-reactive     HIV-1 p24 Antigen 03/18/2023 Non-Reactive     HIV-1 Antibody 03/18/2023 Non-Reactive     HIV-2 Antibody 03/18/2023 Non-Reactive     HIV Ag-Ab 5th Gen 03/18/2023 Non-Reactive     Syphilis Total Antibody 03/18/2023 Non-reactive        Suicide/Homicide Risk Assessment:    The following interventions are recommended: contracts for safety at present - agrees to go to ED if feeling unsafe, contracts for safety at present - agrees to call Crisis Intervention Service if feeling unsafe. Although patient's acute lethality risk is low, long-term/chronic lethality risk is mildly elevated in the presence of depression. At the current moment, Ari Notice is future-oriented, forward-thinking, and demonstrates ability to act in a self-preserving manner as evidenced by volitionally presenting to the clinic today, seeking treatment. To mitigate future risk, patient should adhere to the recommendations below, avoid alcohol/illicit substance use, utilize community-based resources and familiar support and prioritize mental health treatment.  Presently, patient denies active suicidal/homicidal ideation in addition to thoughts of self-injury; contracts for safety. At conclusion of evaluation, patient is amenable to the recommendations below. Patient is amenable to calling/contacting the outpatient office including this writer if any acute adverse effects of their medication regimen arise in addition to any comments or concerns pertaining to their psychiatric management. Patient is amenable to calling/contacting crisis and/or attending to the nearest emergency department if their clinical condition deteriorates to assure their safety and stability, stating that they are able to appropriately confide in their  regarding their psychiatric state. Assessment/Plan:     Diagnoses and all orders for this visit:    Postpartum depression  -     escitalopram (LEXAPRO) 5 mg tablet; Take 1 tablet (5 mg total) by mouth daily Take with 10mg tablet for total of 15mg.  -     clonazePAM (KlonoPIN) 0.5 mg tablet; Take 1 tablet (0.5 mg total) by mouth daily at bedtime as needed for anxiety    Panic attacks    Grief associated with loss of fetus    Mellissa Pedro is a 26year old female who is struggling with post-partum depression. She had some depression and anxiety in the past, but symptoms have been made significantly worse by the death of her son in utero. She is still struggling with some nightmares and flashbacks related to the incident, but improving somewhat as she has been better able to distract herself with work. Wellbutrin seems to have helped her symptoms somewhat, may be experiencing some insomnia related to the medication. She seems to be making progress, has a lot of support, and does not appear to be in acute danger to herself or others at this time. Treatment Recommendations/Precautions: Will increase lexapro to 15mg daily for treatment of her depression and anxiety. Will d/c mirtazapine due to concerns it could be causing worsening suicidal thoughts.  Will start klonopin 0.5mg qHS instead, advised to use as needed but can use nightly until next appointment if necessary due to intense emotional distress of her grief. Will continue with Wellbutrin  mg daily. Medication management every 4 weeks  Continue psychotherapy with own therapist  Aware of 24 hour and weekend coverage for urgent situations accessed by calling [facility] practice number  Patient advised to call 911 if feeling suicidal or homicidal before acting out on their thoughts and they expressed understanding. Medications Risks/Benefits      Risks, Benefits And Possible Side Effects Of Medications:    Risks, benefits, and possible side effects of medications explained to [facility] and she verbalizes understanding and agreement for treatment. including: Risks and potential side effects of medication discussed with patient including serotonin syndrome, SIADH, worsening depression, suicidality, induction of zay, GI upset, headaches, activation, sexual side effects, sedation, potential drug interactions, and others. Patient expressed understanding. .     Controlled Medication Discussion:     Discussed with [facility] the risks of sedation, respiratory depression, impairment of ability to drive and potential for abuse and addiction related to treatment with benzodiazepine medications. She understands risk of treatment with benzodiazepine medications, agrees to not drive if feels impaired and agrees to take medications as prescribed    Psychotherapy Provided:     Individual psychotherapy provided: Yes  Counseling was provided during the session today for 18 minutes. Medication education provided to [facility]. Discussed with [facility] coping with death of son, social difficulties, and sleep issues . Coping techniques including cognitive restructuring, getting into a good routine, make a gratitude list, making a list of future goals, meditation, mindfulness, talking to family, and scheduling bereavement  reviewed with [facility].    Reassurance and supportive therapy provided.           Treatment Plan:    Completed and signed during the session: Not applicable - Treatment Plan not due at this session    This note was not shared with the patient due to this is a psychotherapy note      Tobias Ochoa DO 11/22/23

## 2023-11-28 ENCOUNTER — SOCIAL WORK (OUTPATIENT)
Dept: BEHAVIORAL/MENTAL HEALTH CLINIC | Facility: CLINIC | Age: 33
End: 2023-11-28
Payer: COMMERCIAL

## 2023-11-28 DIAGNOSIS — F32.1 CURRENT MODERATE EPISODE OF MAJOR DEPRESSIVE DISORDER WITHOUT PRIOR EPISODE (HCC): Primary | ICD-10-CM

## 2023-11-28 DIAGNOSIS — F41.9 ANXIETY: ICD-10-CM

## 2023-11-28 DIAGNOSIS — F43.11 ACUTE POST-TRAUMATIC STRESS DISORDER: ICD-10-CM

## 2023-11-28 PROCEDURE — 90832 PSYTX W PT 30 MINUTES: CPT | Performed by: SOCIAL WORKER

## 2023-11-28 NOTE — PSYCH
Assessment/Plan:      Diagnoses and all orders for this visit:    Current moderate episode of major depressive disorder without prior episode ([address])    Acute post-traumatic stress disorder    Anxiety          Subjective: Pt presented for follow up therapy session. Pt has been feeling depressed with the holidays and not having her baby here with her. She had been started on Remeron but she experienced SI on this. She is now taking Klonopin instead and finds this to be helpful for her sleep at night. Pt continues to feel irritable and lashes out on her  and daughter. Pt stated, "I don't know how to love myself again"  She continues to blame herself for the loss and is very angry. She feels the worst at home because she is not keeping herself busy. Discussed some things that pt can do at home. Suggested pt put some things to do on the calendar so she has something to look forward to. Discussed with pt the need to be kind to herself and ways in which she may accomplish this. Patient ID: Kimi Walsh is a 26 y.o. female. HPI    Review of Systems      Objective:     Physical Exam      Behavioral Health Psychotherapy Progress Note    Psychotherapy Provided: Individual Psychotherapy     1. Current moderate episode of major depressive disorder without prior episode ([address])        2. Acute post-traumatic stress disorder        3. Anxiety            Goals addressed in session: Goal 1     DATA:   During this session, this clinician used the following therapeutic modalities: Engagement Strategies, Client-centered Therapy, Mindfulness-based Strategies, Solution-Focused Therapy, and Supportive Psychotherapy    Substance Abuse was not addressed during this session.  If the client is diagnosed with a co-occurring substance use disorder, please indicate any changes in the frequency or amount of use: na. Stage of change for addressing substance use diagnoses: No substance use/Not applicable    ASSESSMENT:  Tracy Meneses Jordon Ennis presents with a Depressed mood. her affect is Tearful, which is congruent, with her mood and the content of the session. The client has not made progress on their goals. Gómez Snyder presents with a none risk of suicide, none risk of self-harm, and none risk of harm to others. For any risk assessment that surpasses a "low" rating, a safety plan must be developed. A safety plan was indicated: no  If yes, describe in detail na    PLAN: Between sessions, Gómez Snyder will continue to utilize coping skills. At the next session, the therapist will use Engagement Strategies, Client-centered Therapy, Mindfulness-based Strategies, Solution-Focused Therapy, and Supportive Psychotherapy to address anxiety, depression. Behavioral Health Treatment Plan and Discharge Planning: Gómez Snyder is aware of and agrees to continue to work on their treatment plan. They have identified and are working toward their discharge goals.  yes    Visit start and stop times:    11/28/23  Start Time: 1600  Stop Time: 1630  Total Visit Time: 30 minutes

## 2023-11-28 NOTE — BH TREATMENT PLAN
Outpatient Behavioral Health Psychotherapy Treatment Plan    Kimi Walsh  1990     Date of Initial Psychotherapy Assessment: 7/25/23  Date of Current Treatment Plan: 11/28/23  Treatment Plan Target Date: 11/28/23  Treatment Plan Expiration Date: 5/28/24    Diagnosis:   No diagnosis found. Area(s) of Need: Reducing anxiety and depression    Long Term Goal 1 (in the client's own words): Not blaming myself all the time; I'm too hard on myself    Stage of Change: Preparation    Target Date for completion: 5/28/23     Anticipated therapeutic modalities: Supportive; Solution focused; mindfulness     People identified to complete this goal: Makayla      Objective 1: (identify the means of measuring success in meeting the objective): Reduce anxiety and depressive symptoms from daily to 2 days per week. I am currently under the care of a [facility] psychiatric provider: yes    My [facility] psychiatric provider is: Madelyn Smith, DO    I am currently taking psychiatric medications: Yes, as prescribed    I feel that I will be ready for discharge from mental health care when I reach the following (measurable goal/objective): When I can manage my symptoms 2 days per week    For children and adults who have a legal guardian:   Has there been any change to custody orders and/or guardianship status? NA. If yes, attach updated documentation. I have created my Crisis Plan and have been offered a copy of this plan    [address]: Diagnosis and Treatment Plan explained to Marylou Worthy acknowledges an understanding of their diagnosis. Kimi Walsh agrees to this treatment plan.     I have been offered a copy of this Treatment Plan. yes

## 2023-11-28 NOTE — BH CRISIS PLAN
Client Name: Garrett Wellington       Client YOB: 1990  : 1990    Treatment Team (include name and contact information):     Psychotherapist: Judie Dakins, LCSW    Psychiatrist: Gisell Rosas DO   Release of information completed: no    " NA   Release of information completed: no    Other (Specify Role): NA    Release of information completed: no    Other (Specify Role): NA   Release of information completed: no    Healthcare Provider  Biju Gonzalez DO  [address] [facility] [address]  [phone]    Type of Plan   * Child plans (children 15 yo and younger) must be completed and signed by the child's legal guardian   * Plans for all individuals 15 yo and above must be signed by the client. Plan Type: adolescent/adult (15 and over) Initial      My Personal Strengths are (in the client's own words):  "Good hearted; I'm mindful of others' feelings; loyal, trustworthy"  The stressors and triggers that may put me at risk are:  Losing my son; Seeing other people's kids and other pregnant women    Coping skills I can use to keep myself calm and safe: Other (describe) Walks; reading or watching tv; talking to my     Coping skills/supports I can use to maintain abstinence from substance use:   Not Applicable    The people that provide me with help and support: (Include name, contact, and how they can help)   Support person #1: Kalie Marquez,     * Phone number: in cell phone    * How can they help me?  He can help me feel better when I'm down       In the past, the following has helped me in times of crisis:    Being in a quiet space, Being with other people, and Taking a walk or exercising      If it is an emergency and you need immediate help, call     If there is a possibility of danger to yourself or others, call the following crisis hotline resources:     Adult Crisis Numbers  Suicide Prevention Hotline - Dial   [facility]: [address]: [address]: [address]: [phone]  [address] [place] [address] Street: [phone]  [place]: [address]: [address]: [phone] (daytime). [phone] (after hours, weekends, holidays)     Child/Adolescent Crisis Numbers   [facility]: [address]: [phone]   [facility] Board: [phone]   [address] [place] [address] Street: [phone]    Please note: Some [facility] do not have a separate number for Child/Adolescent specific crisis. If your county is not listed under Child/Adolescent, please call the adult number for your county     National Talk to Text Line   All Ages - [phone]    In the event your feelings become unmanageable, and you cannot reach your support system, you will call 911 immediately or go to the nearest hospital emergency room.

## 2023-12-04 ENCOUNTER — TELEPHONE (OUTPATIENT)
Dept: PSYCHIATRY | Facility: CLINIC | Age: 33
End: 2023-12-04

## 2023-12-04 RX ORDER — CLONAZEPAM 0.5 MG/1
0.5 TABLET ORAL
Qty: 14 TABLET | Refills: 0 | Status: SHIPPED | OUTPATIENT
Start: 2023-12-04

## 2023-12-04 NOTE — TELEPHONE ENCOUNTER
Attempted to contact the patient regarding a medication refill request. Left vm for patient to contact the office back go gain information about the medication and preferred pharmacy to submit a request.

## 2023-12-06 ENCOUNTER — OFFICE VISIT (OUTPATIENT)
Dept: PSYCHIATRY | Facility: CLINIC | Age: 33
End: 2023-12-06
Payer: COMMERCIAL

## 2023-12-06 DIAGNOSIS — F43.21 GRIEF ASSOCIATED WITH LOSS OF FETUS: ICD-10-CM

## 2023-12-06 DIAGNOSIS — F41.0 PANIC ATTACKS: ICD-10-CM

## 2023-12-06 PROCEDURE — 90833 PSYTX W PT W E/M 30 MIN: CPT | Performed by: STUDENT IN AN ORGANIZED HEALTH CARE EDUCATION/TRAINING PROGRAM

## 2023-12-06 PROCEDURE — 99214 OFFICE O/P EST MOD 30 MIN: CPT | Performed by: STUDENT IN AN ORGANIZED HEALTH CARE EDUCATION/TRAINING PROGRAM

## 2023-12-07 NOTE — PSYCH
MEDICATION MANAGEMENT NOTE        [facility]      Name and Date of Birth:  Kimi Walsh 26 y.o. 1990 MRN: [id number]    Date of Visit: 23   Visit Time    Visit Start Time: 750  Visit Stop Time: 4460  Total Visit Duration:  30 minutes      Reason for Visit: Follow-up visit regarding medication management     Chief Complaint: "I feel better now. I'm having trouble accepting things."    SUBJECTIVE:    Kimi Walsh is a 26 y.o., female, possessing a past psychiatric history significant for anxiety, who recently experienced significant stress following the in-utero fetal demise and having to deliver still born baby at 10 months in 2023, who was personally seen and evaluated at the [address] [place] [address] outpatient clinic for follow-up regarding medication management. Tracy Meneses is currently prescribed Wellbutrin  mg daily, lexapro 15mg daily and klonopin 0.5mg qHS PRN anxiety. During interview today, Tracy Meneses states she is feeling "better". States that after she stopped the mirtazapine, she did not feel as suicidal.  States that Klonopin has been helpful, helping her feel more calm at night when she is sleeping. Admits that she still has some trouble falling asleep, will take the medication around 8 PM, and does not fall asleep for about 3 hours. States that she does feel slightly groggy in the morning. We discussed risk and benefits of increasing medication slightly, she agrees to try 0.75 mg and call me if she feels she needs to increase it to 1 mg at bedtime. Denies any other side effects from the medication. States that she also feels increasing the Lexapro has been helpful. States that she is grateful her  has been providing support. She admits that after her last appointment, she went into the room where her  sons new baby items were placed.   States that she has not gone through the items, but it was a big step for her to open the room. She admits that she does not feel she has excepted that he is gone, and she still feels anxious about the upcoming holidays. States that she feels "like I just keep thinking that he should have been here for this [place] ". States that she is grateful she has her 9year-old daughter. States that she is still trying not to think about why this happened, and why  other women have children when she does not. She states that she does find therapy helpful. She contracts for safety, denies thoughts to herself or anyone else. She states she is going to work on sleep hygiene and finding relaxing exercises before bed. Current Rating Scores:   None completed today. Psychiatric Review Of Systems:    Appetite: decreased  Adverse eating: no  Weight changes: no  Insomnia/sleeplessness: yes  Fatigue/anergy: yes  Anhedonia/lack of interest:  some days  Attention/concentration: no change  Psychomotor agitation/retardation: no  Somatic symptoms: no  Anxiety/panic attack: panic attacks, worrying are lessening. Ana María/hypomania: no  Hopelessness/helplessness/worthlessness: yes, sometimes feels helpless with the emotional pain. Self-injurious behavior/high-risk behavior: no  Suicidal ideation: no  Homicidal ideation: no  Auditory hallucinations: no  Visual hallucinations: no  Other perceptual disturbances: no  Delusional thinking: no  Obsessive/compulsive symptoms: no    Review Of Systems:      Constitutional negative   ENT negative   Cardiovascular negative   Respiratory negative   Gastrointestinal negative   Genitourinary negative   Musculoskeletal negative   Integumentary negative   Neurological negative   Endocrine negative   Other Symptoms none, all other systems are negative     History Review:    The following portions of the patient's history were reviewed and updated as appropriate: allergies, current medications, past family history, past medical history, past social history, past surgical history and problem list..         OBJECTIVE:     Vital signs in last 24 hours: There were no vitals filed for this visit. Mental Status Evaluation:    Appearance age appropriate, casually dressed   Behavior cooperative, appears anxious, slightly more calm   Speech normal rate, normal volume, normal pitch   Mood still at times anxious, slightly less dysphoric   Affect tearful, but ramsay range   Thought Processes organized, goal directed   Associations intact associations, less perseverative on her son's death   Thought Content no overt delusions   Perceptual Disturbances: no auditory hallucinations, no visual hallucinations   Abnormal Thoughts  Risk Potential Suicidal ideation - None  Homicidal ideation - None  Potential for aggression - No   Orientation oriented to person, place, time/date and situation   Memory recent and remote memory grossly intact   Consciousness alert and awake   Attention Span Concentration Span attention span and concentration are age appropriate   Intellect appears to be of average intelligence   Insight intact   Judgement intact   Muscle Strength and  Gait normal muscle strength and normal muscle tone, normal gait and normal balance   Motor activity no abnormal movements   Fund of Knowledge adequate knowledge of current events  adequate fund of knowledge regarding past history  adequate fund of knowledge regarding vocabulary    Pain none   Pain Scale 0       Laboratory Results: I have personally reviewed all pertinent laboratory/tests results    Recent Labs (last 2 months):   No visits with results within 2 Month(s) from this visit.    Latest known visit with results is:   Appointment on 03/18/2023   Component Date Value    ABO Grouping 03/18/2023 B     Rh Factor 03/18/2023 Positive     Antibody Screen 03/18/2023 Negative     Specimen Expiration Date 03/18/2023 [id number]     Urine Culture 03/18/2023 No Growth <1000 cfu/mL     WBC 03/18/2023 6.76     RBC 03/18/2023 4.28 Hemoglobin 03/18/2023 13.0     Hematocrit 03/18/2023 38.8     MCV 03/18/2023 91     MCH 03/18/2023 30.4     MCHC 03/18/2023 33.5     RDW 03/18/2023 13.7     MPV 03/18/2023 11.0     Platelets 22/71/7504 225     nRBC 03/18/2023 0     Neutrophils Relative 03/18/2023 72     Immat GRANS % 03/18/2023 0     Lymphocytes Relative 03/18/2023 23     Monocytes Relative 03/18/2023 4     Eosinophils Relative 03/18/2023 1     Basophils Relative 03/18/2023 0     Neutrophils Absolute 03/18/2023 4.84     Immature Grans Absolute 03/18/2023 0.02     Lymphocytes Absolute 03/18/2023 1.58     Monocytes Absolute 03/18/2023 0.27     Eosinophils Absolute 03/18/2023 0.04     Basophils Absolute 03/18/2023 0.01     Rubella IgG Quant 03/18/2023 109.6     Hep B S Ab 03/18/2023 5.06     Hepatitis B Surface Ag 03/18/2023 Non-reactive     Hepatitis C Ab 03/18/2023 Non-reactive     HIV-1 p24 Antigen 03/18/2023 Non-Reactive     HIV-1 Antibody 03/18/2023 Non-Reactive     HIV-2 Antibody 03/18/2023 Non-Reactive     HIV Ag-Ab 5th Gen 03/18/2023 Non-Reactive     Syphilis Total Antibody 03/18/2023 Non-reactive        Suicide/Homicide Risk Assessment:    The following interventions are recommended: contracts for safety at present - agrees to go to ED if feeling unsafe, contracts for safety at present - agrees to call Crisis Intervention Service if feeling unsafe. Although patient's acute lethality risk is low, long-term/chronic lethality risk is mildly elevated in the presence of depression. At the current moment, Fatoumatadecarter Drilling is future-oriented, forward-thinking, and demonstrates ability to act in a self-preserving manner as evidenced by volitionally presenting to the clinic today, seeking treatment. To mitigate future risk, patient should adhere to the recommendations below, avoid alcohol/illicit substance use, utilize community-based resources and familiar support and prioritize mental health treatment.  Presently, patient denies active suicidal/homicidal ideation in addition to thoughts of self-injury; contracts for safety. At conclusion of evaluation, patient is amenable to the recommendations below. Patient is amenable to calling/contacting the outpatient office including this writer if any acute adverse effects of their medication regimen arise in addition to any comments or concerns pertaining to their psychiatric management. Patient is amenable to calling/contacting crisis and/or attending to the nearest emergency department if their clinical condition deteriorates to assure their safety and stability, stating that they are able to appropriately confide in their  regarding their psychiatric state. Assessment/Plan:     Diagnoses and all orders for this visit:    Postpartum depression    Panic attacks    Grief associated with loss of fetus      Jean-Paul Vaughn is a 26year old female who is struggling with post-partum depression. She had some depression and anxiety in the past, but symptoms have been made significantly worse by the death of her son in utero. She is still struggling with some nightmares and flashbacks related to the incident, but improving somewhat as she has been better able to distract herself with work. Wellbutrin seems to have helped her symptoms somewhat, may be experiencing some insomnia related to the medication. She seems to be making progress, has a lot of support, and does not appear to be in acute danger to herself or others at this time. Treatment Recommendations/Precautions: Will continue lexapro 15mg daily for treatment of her depression and anxiety. Will continue klonopin, but increase to 0.75mg, advised to use as needed but can use nightly until next appointment if necessary due to intense emotional distress of her grief. Will continue with Wellbutrin  mg daily.   Medication management every 4 weeks  Continue psychotherapy with own therapist  Aware of 24 hour and weekend coverage for urgent situations accessed by calling [facility] practice number  Patient advised to call 911 if feeling suicidal or homicidal before acting out on their thoughts and they expressed understanding. Medications Risks/Benefits      Risks, Benefits And Possible Side Effects Of Medications:    Risks, benefits, and possible side effects of medications explained to [facility] and she verbalizes understanding and agreement for treatment. including: Risks and potential side effects of medication discussed with patient including serotonin syndrome, SIADH, worsening depression, suicidality, induction of zay, GI upset, headaches, activation, sexual side effects, sedation, potential drug interactions, and others. Patient expressed understanding. .     Controlled Medication Discussion:     Discussed with [facility] the risks of sedation, respiratory depression, impairment of ability to drive and potential for abuse and addiction related to treatment with benzodiazepine medications. She understands risk of treatment with benzodiazepine medications, agrees to not drive if feels impaired and agrees to take medications as prescribed    Psychotherapy Provided:     Individual psychotherapy provided: Yes  Counseling was provided during the session today for 18 minutes. Medication education provided to [facility]. Discussed with [facility] coping with death of son, social difficulties, and sleep issues . Coping techniques including cognitive restructuring, getting into a good routine, make a gratitude list, making a list of future goals, meditation, mindfulness, talking to family, and scheduling bereavement  reviewed with [facility]. Reassurance and supportive therapy provided.           Treatment Plan:    Completed and signed during the session: Not applicable - Treatment Plan not due at this session    This note was not shared with the patient due to this is a psychotherapy note      Esau Kelly DO 12/06/23

## 2023-12-21 ENCOUNTER — OFFICE VISIT (OUTPATIENT)
Dept: URGENT CARE | Facility: MEDICAL CENTER | Age: 33
End: 2023-12-21
Payer: COMMERCIAL

## 2023-12-21 VITALS
DIASTOLIC BLOOD PRESSURE: 70 MMHG | OXYGEN SATURATION: 98 % | RESPIRATION RATE: 18 BRPM | BODY MASS INDEX: 32.65 KG/M2 | SYSTOLIC BLOOD PRESSURE: 116 MMHG | HEART RATE: 110 BPM | HEIGHT: 65 IN | TEMPERATURE: 98.9 F | WEIGHT: 196 LBS

## 2023-12-21 DIAGNOSIS — J11.1 INFLUENZA-LIKE ILLNESS: Primary | ICD-10-CM

## 2023-12-21 PROCEDURE — 99212 OFFICE O/P EST SF 10 MIN: CPT | Performed by: PHYSICIAN ASSISTANT

## 2023-12-21 NOTE — LETTER
December 21, 2023     Patient: Makayla Koenig   YOB: 1990   Date of Visit: 12/21/2023       To Whom it May Concern:    Makayla Koenig was seen in my clinic on 12/21/2023. She may return to school on 12/22/23 .    If you have any questions or concerns, please don't hesitate to call.         Sincerely,          Lorie Demarco PA-C        CC:   No Recipients

## 2023-12-21 NOTE — PROGRESS NOTES
[facility]        NAME: Makayla Koenig is a 33 y.o. female  : 1990    MRN: [id number]  DATE: 2023  TIME: 4:21 PM    Assessment and Plan   Influenza-like illness [J11.1]  1. Influenza-like illness              Patient Instructions     Take Tylenol or Motrin as needed for fever or pain  May take over the counter cold medication to control symptoms  Drink plenty of fluids  Rest  You are contagious until fever resolves  If symptoms worsen go to the ER for further evaluation  Follow up with PCP in 3-5 days.  Proceed to  ER if symptoms worsen.    Chief Complaint     Chief Complaint   Patient presents with   • exposure to the flu     Exposure to the flu was having s/s x 4 days but now is feeling better . Missed 4 days of work needs note          History of Present Illness       Patient states she was exposed to the flu and has improved. Missed 4 days of work and needs a note. Last fever yesterday.        Review of Systems   Review of Systems   Constitutional:  Negative for chills and fever.   HENT:  Positive for congestion, rhinorrhea and sore throat.    Respiratory:  Positive for cough.    Gastrointestinal:  Negative for diarrhea, nausea and vomiting.   Musculoskeletal:  Positive for myalgias.   Neurological:  Negative for headaches.         Current Medications       Current Outpatient Medications:   •  buPROPion (Wellbutrin SR) 150 mg 12 hr tablet, Take 1 tablet (150 mg total) by mouth in the morning, Disp: 90 tablet, Rfl: 1  •  clonazePAM (KlonoPIN) 0.5 mg tablet, Take 1 tablet (0.5 mg total) by mouth daily at bedtime as needed for anxiety, Disp: 14 tablet, Rfl: 0  •  escitalopram (LEXAPRO) 10 mg tablet, Take 1 tablet (10 mg total) by mouth daily, Disp: 30 tablet, Rfl: 2  •  escitalopram (LEXAPRO) 5 mg tablet, Take 1 tablet (5 mg total) by mouth daily Take with 10mg tablet for total of 15mg., Disp: 30 tablet, Rfl: 1  •  ibuprofen (MOTRIN) 600 mg tablet, Take 1 tablet (600 mg total) by mouth  every 6 (six) hours as needed for mild pain, Disp: 60 tablet, Rfl: 0  •  ibuprofen (MOTRIN) 600 mg tablet, Take 1 tablet (600 mg total) by mouth every 6 (six) hours as needed for mild pain or moderate pain, Disp: 30 tablet, Rfl: 0  •  meclizine (ANTIVERT) 12.5 MG tablet, Take 1 tablet (12.5 mg total) by mouth every 12 (twelve) hours as needed for dizziness, Disp: 30 tablet, Rfl: 0  •  Elagolix Sodium (Orilissa) 150 MG TABS, Take 150 mg by mouth in the morning (Patient not taking: Reported on 12/7/2022), Disp: , Rfl:   •  fexofenadine-pseudoephedrine (ALLEGRA-D 24) 180-240 MG per 24 hr tablet, Take 1 tablet by mouth daily (Patient not taking: Reported on 12/21/2023), Disp: 10 tablet, Rfl: 1  •  metFORMIN (GLUCOPHAGE-XR) 500 mg 24 hr tablet, Take 2 tablets (1,000 mg total) by mouth 2 (two) times a day with meals, Disp: 360 tablet, Rfl: 1  •  ondansetron (ZOFRAN) 4 mg tablet, Take 1 tablet (4 mg total) by mouth every 8 (eight) hours as needed for nausea or vomiting for up to 3 days, Disp: 10 tablet, Rfl: 0    Current Allergies     Allergies as of 12/21/2023 - Reviewed 12/21/2023   Allergen Reaction Noted   • Amoxicillin Hives 10/01/2012            The following portions of the patient's history were reviewed and updated as appropriate: allergies, current medications, past family history, past medical history, past social history, past surgical history and problem list.     Past Medical History:   Diagnosis Date   • Benign liver cyst    • Class 2 obesity due to excess calories with body mass index (BMI) of 35.0 to 35.9 in adult 06/13/2022   • Depression    • Ovarian cyst    • PONV (postoperative nausea and vomiting)    • Seasonal allergies        Past Surgical History:   Procedure Laterality Date   • APPENDECTOMY     • CHOLECYSTECTOMY  11/20/2014   • HERNIA REPAIR  2009    x3   • LIVER SURGERY  11/20/2014    liver cyst resection with removal of lesion   • OVARIAN CYST REMOVAL Right 2006   • WA HYSTEROSCOPY BX  "ENDOMETRIUM&/POLYPC W/WO D&C N/A 7/21/2022    Procedure: HYSTEROSCOPY, BIOPSY;  Surgeon: Tara Budinetz, DO;  Location:  MAIN OR;  Service: Gynecology   • CT LAPAROSCOPY W/RMVL ADNEXAL STRUCTURES N/A 7/21/2022    Procedure: LAPAROSCOPY; FULGERATION OF ENDOMETRIOSIS; DRAINAGE OF B/L OVARIAN CYSTS;;  Surgeon: Tara Budinetz, DO;  Location:  MAIN OR;  Service: Gynecology       Family History   Problem Relation Age of Onset   • Hypertension Mother    • Irritable bowel syndrome Father    • Breast cancer Maternal Aunt    • Diabetes Maternal Grandfather    • Cancer Other          Medications have been verified.        Objective   /70   Pulse (!) 110   Temp 98.9 °F (37.2 °C)   Resp 18   Ht 5' 5\" (1.651 m)   Wt 88.9 kg (196 lb)   SpO2 98%   BMI 32.62 kg/m²   No LMP recorded.       Physical Exam     Physical Exam  Vitals and nursing note reviewed.   Constitutional:       Appearance: Normal appearance.   HENT:      Head: Normocephalic and atraumatic.      Right Ear: Tympanic membrane normal.      Left Ear: Tympanic membrane normal.      Mouth/Throat:      Mouth: Mucous membranes are moist.      Pharynx: Oropharynx is clear.   Eyes:      Conjunctiva/sclera: Conjunctivae normal.   Cardiovascular:      Rate and Rhythm: Normal rate and regular rhythm.      Heart sounds: Normal heart sounds.   Pulmonary:      Effort: Pulmonary effort is normal.      Breath sounds: Normal breath sounds.   Musculoskeletal:      Cervical back: Neck supple.   Lymphadenopathy:      Cervical: No cervical adenopathy.   Skin:     General: Skin is warm.   Neurological:      Mental Status: She is alert.                   "

## 2023-12-22 ENCOUNTER — OFFICE VISIT (OUTPATIENT)
Dept: FAMILY MEDICINE CLINIC | Facility: CLINIC | Age: 33
End: 2023-12-22
Payer: COMMERCIAL

## 2023-12-22 VITALS
BODY MASS INDEX: 32.65 KG/M2 | TEMPERATURE: 96.8 F | DIASTOLIC BLOOD PRESSURE: 78 MMHG | HEART RATE: 134 BPM | WEIGHT: 196 LBS | HEIGHT: 65 IN | OXYGEN SATURATION: 97 % | SYSTOLIC BLOOD PRESSURE: 108 MMHG

## 2023-12-22 DIAGNOSIS — J22 LOWER RESPIRATORY TRACT INFECTION: Primary | ICD-10-CM

## 2023-12-22 PROCEDURE — 99213 OFFICE O/P EST LOW 20 MIN: CPT | Performed by: FAMILY MEDICINE

## 2023-12-22 RX ORDER — CLONAZEPAM 0.5 MG/1
0.5 TABLET ORAL
Qty: 14 TABLET | Refills: 0 | Status: SHIPPED | OUTPATIENT
Start: 2023-12-22

## 2023-12-22 RX ORDER — AZITHROMYCIN 250 MG/1
TABLET, FILM COATED ORAL
Qty: 6 TABLET | Refills: 0 | Status: SHIPPED | OUTPATIENT
Start: 2023-12-22 | End: 2023-12-26

## 2023-12-22 NOTE — PROGRESS NOTES
Assessment/Plan: Patient will be treated with Zithromax she has been sick now for 4 to 5 days and it is too late to use a antiviral flu medication should be treated symptoms will also give her Zithromax cover for bacterial secondary infection    Problem List Items Addressed This Visit    None  Visit Diagnoses       Lower respiratory tract infection    -  Primary    Relevant Medications    azithromycin (ZITHROMAX) 250 mg tablet             Diagnoses and all orders for this visit:    Lower respiratory tract infection  -     azithromycin (ZITHROMAX) 250 mg tablet; Take 2 tablets on 1st day then 1 tablet daily for 4 days        No problem-specific Assessment & Plan notes found for this encounter.        Subjective:      Patient ID: Makayla Koenig is a 33 y.o. female.    HPI    The following portions of the patient's history were reviewed and updated as appropriate:   She has a past medical history of Benign liver cyst, Class 2 obesity due to excess calories with body mass index (BMI) of 35.0 to 35.9 in adult (06/13/2022), Depression, Ovarian cyst, PONV (postoperative nausea and vomiting), and Seasonal allergies.,  does not have any pertinent problems on file.,   has a past surgical history that includes Liver surgery (11/20/2014); Ovarian cyst removal (Right, 2006); Appendectomy; Cholecystectomy (11/20/2014); Hernia repair (2009); pr laparoscopy w/rmvl adnexal structures (N/A, 07/21/2022); pr hysteroscopy bx endometrium&/polypc w/wo d&c (N/A, 07/21/2022); and Dilation and curettage of uterus (06/01/2023).,  family history includes Breast cancer in her maternal aunt; Cancer in her other; Diabetes in her maternal grandfather; Hypertension in her mother; Irritable bowel syndrome in her father.,   reports that she has never smoked. She has never used smokeless tobacco. She reports current alcohol use. She reports that she does not use drugs.,  is allergic to amoxicillin..  Current Outpatient Medications   Medication Sig  "Dispense Refill    azithromycin (ZITHROMAX) 250 mg tablet Take 2 tablets on 1st day then 1 tablet daily for 4 days 6 tablet 0    buPROPion (Wellbutrin SR) 150 mg 12 hr tablet Take 1 tablet (150 mg total) by mouth in the morning 90 tablet 1    clonazePAM (KlonoPIN) 0.5 mg tablet Take 1 tablet (0.5 mg total) by mouth daily at bedtime as needed for anxiety 14 tablet 0    escitalopram (LEXAPRO) 10 mg tablet Take 1 tablet (10 mg total) by mouth daily 30 tablet 2    escitalopram (LEXAPRO) 5 mg tablet Take 1 tablet (5 mg total) by mouth daily Take with 10mg tablet for total of 15mg. 30 tablet 1    meclizine (ANTIVERT) 12.5 MG tablet Take 1 tablet (12.5 mg total) by mouth every 12 (twelve) hours as needed for dizziness 30 tablet 0    metFORMIN (GLUCOPHAGE-XR) 500 mg 24 hr tablet Take 2 tablets (1,000 mg total) by mouth 2 (two) times a day with meals 360 tablet 1    ondansetron (ZOFRAN) 4 mg tablet Take 1 tablet (4 mg total) by mouth every 8 (eight) hours as needed for nausea or vomiting for up to 3 days 10 tablet 0     No current facility-administered medications for this visit.       Review of Systems      Objective:  Vitals:    12/22/23 0851   BP: 108/78   BP Location: Left arm   Patient Position: Sitting   Cuff Size: Large   Pulse: (!) 134   Temp: (!) 96.8 °F (36 °C)   TempSrc: Temporal   SpO2: 97%   Weight: 88.9 kg (196 lb)   Height: 5' 5\" (1.651 m)     Body mass index is 32.62 kg/m².     Physical Exam    "

## 2024-01-02 ENCOUNTER — OFFICE VISIT (OUTPATIENT)
Dept: PSYCHIATRY | Facility: CLINIC | Age: 34
End: 2024-01-02
Payer: COMMERCIAL

## 2024-01-02 DIAGNOSIS — F43.21 GRIEF ASSOCIATED WITH LOSS OF FETUS: ICD-10-CM

## 2024-01-02 DIAGNOSIS — F41.0 PANIC ATTACKS: ICD-10-CM

## 2024-01-02 PROCEDURE — 99214 OFFICE O/P EST MOD 30 MIN: CPT | Performed by: STUDENT IN AN ORGANIZED HEALTH CARE EDUCATION/TRAINING PROGRAM

## 2024-01-02 PROCEDURE — 90833 PSYTX W PT W E/M 30 MIN: CPT | Performed by: STUDENT IN AN ORGANIZED HEALTH CARE EDUCATION/TRAINING PROGRAM

## 2024-01-02 RX ORDER — ESCITALOPRAM OXALATE 10 MG/1
10 TABLET ORAL DAILY
Qty: 30 TABLET | Refills: 2 | Status: SHIPPED | OUTPATIENT
Start: 2024-01-02

## 2024-01-02 RX ORDER — CLONAZEPAM 0.5 MG/1
TABLET ORAL
Qty: 30 TABLET | Refills: 0 | Status: SHIPPED | OUTPATIENT
Start: 2024-01-02

## 2024-01-02 RX ORDER — BUPROPION HYDROCHLORIDE 150 MG/1
150 TABLET, EXTENDED RELEASE ORAL DAILY
Qty: 90 TABLET | Refills: 1 | Status: SHIPPED | OUTPATIENT
Start: 2024-01-02

## 2024-01-02 RX ORDER — ESCITALOPRAM OXALATE 5 MG/1
5 TABLET ORAL DAILY
Qty: 30 TABLET | Refills: 1 | Status: SHIPPED | OUTPATIENT
Start: 2024-01-02

## 2024-01-03 NOTE — PSYCH
"MEDICATION MANAGEMENT NOTE        [facility]      Name and Date of Birth:  Makayla Koenig 33 y.o. 1990 MRN: [id number]    Date of Visit: 24     Visit Time    Visit Start Time: 1615  Visit Stop Time: 1645  Total Visit Duration:  30 minutes      Reason for Visit: Follow-up visit regarding medication management     Chief Complaint: \"I feel like things are getting better; it comes in waves.\"    SUBJECTIVE:    Makayla Koenig is a 33 y.o., female, possessing a past psychiatric history significant for anxiety, who recently experienced significant stress following the in-utero fetal demise and having to deliver still born baby at 7 months in 2023, who was personally seen and evaluated at the [facility] outpatient clinic for follow-up regarding medication management. Makayla is currently prescribed Wellbutrin  mg daily, lexapro 15mg daily and klonopin 0.5mg qHS PRN anxiety.   During interview today, Makayla states she is feeling \"pretty good\".  States she still feels her grief comes \"in waves\", but she is having more moments where she is not feeling down or upset. States she feels her medications are helping, concedes she has occasionally increased her Klonopin to 1mg at bedtime.  States she feels this works better for her, especially over the holidays.  States she had a good time, connected with an aunt who had a still-born birth 20 years ago. States she still has not gone through all of her  son's items in his room, but she and  are still thinking about having another child. States she enjoys spending time with her daughter, did think about how her son wasn't there to open presents. States that the experience with the medium was helpful, \"I know he said he would have been very sick if he had lived\".  She denies thoughts to hurt herself or anyone else, denies any hallucinations. States she is staying busy helping her " father's basketball team.        Current Rating Scores:   None completed today.    Psychiatric Review Of Systems:    Appetite: no  Adverse eating: no  Weight changes: no  Insomnia/sleeplessness: no  Fatigue/anergy: yes  Anhedonia/lack of interest: no, improving  Attention/concentration: no change  Psychomotor agitation/retardation: no  Somatic symptoms: no  Anxiety/panic attack: panic attacks, worrying are lessening.   Ana María/hypomania: no  Hopelessness/helplessness/worthlessness: feeling more in control.   Self-injurious behavior/high-risk behavior: no  Suicidal ideation: no  Homicidal ideation: no  Auditory hallucinations: no  Visual hallucinations: no  Other perceptual disturbances: no  Delusional thinking: no  Obsessive/compulsive symptoms: no    Review Of Systems:      Constitutional negative   ENT negative   Cardiovascular negative   Respiratory negative   Gastrointestinal negative   Genitourinary negative   Musculoskeletal negative   Integumentary negative   Neurological negative   Endocrine negative   Other Symptoms none, all other systems are negative     History Review:   The following portions of the patient's history were reviewed and updated as appropriate: allergies, current medications, past family history, past medical history, past social history, past surgical history and problem list..         OBJECTIVE:     Vital signs in last 24 hours:    There were no vitals filed for this visit.    Mental Status Evaluation:    Appearance age appropriate, casually dressed   Behavior cooperative, appears anxious, slightly more calm   Speech normal rate, normal volume, normal pitch   Mood less anxious, less dysphoric   Affect brighter, less constricted   Thought Processes organized, goal directed   Associations intact associations, less perseverative on her son's death   Thought Content no overt delusions   Perceptual Disturbances: no auditory hallucinations, no visual hallucinations   Abnormal Thoughts  Risk  Potential Suicidal ideation - None  Homicidal ideation - None  Potential for aggression - No   Orientation oriented to person, place, time/date and situation   Memory recent and remote memory grossly intact   Consciousness alert and awake   Attention Span Concentration Span attention span and concentration are age appropriate   Intellect appears to be of average intelligence   Insight intact   Judgement intact   Muscle Strength and  Gait normal muscle strength and normal muscle tone, normal gait and normal balance   Motor activity no abnormal movements   Fund of Knowledge adequate knowledge of current events  adequate fund of knowledge regarding past history  adequate fund of knowledge regarding vocabulary    Pain none   Pain Scale 0       Laboratory Results: I have personally reviewed all pertinent laboratory/tests results    Recent Labs (last 2 months):   No visits with results within 2 Month(s) from this visit.   Latest known visit with results is:   Appointment on 03/18/2023   Component Date Value    ABO Grouping 03/18/2023 B     Rh Factor 03/18/2023 Positive     Antibody Screen 03/18/2023 Negative     Specimen Expiration Date 03/18/2023 20230321     Urine Culture 03/18/2023 No Growth <1000 cfu/mL     WBC 03/18/2023 6.76     RBC 03/18/2023 4.28     Hemoglobin 03/18/2023 13.0     Hematocrit 03/18/2023 38.8     MCV 03/18/2023 91     MCH 03/18/2023 30.4     MCHC 03/18/2023 33.5     RDW 03/18/2023 13.7     MPV 03/18/2023 11.0     Platelets 03/18/2023 225     nRBC 03/18/2023 0     Neutrophils Relative 03/18/2023 72     Immat GRANS % 03/18/2023 0     Lymphocytes Relative 03/18/2023 23     Monocytes Relative 03/18/2023 4     Eosinophils Relative 03/18/2023 1     Basophils Relative 03/18/2023 0     Neutrophils Absolute 03/18/2023 4.84     Immature Grans Absolute 03/18/2023 0.02     Lymphocytes Absolute 03/18/2023 1.58     Monocytes Absolute 03/18/2023 0.27     Eosinophils Absolute 03/18/2023 0.04     Basophils Absolute  03/18/2023 0.01     Rubella IgG Quant 03/18/2023 109.6     Hep B S Ab 03/18/2023 5.06     Hepatitis B Surface Ag 03/18/2023 Non-reactive     Hepatitis C Ab 03/18/2023 Non-reactive     HIV-1 p24 Antigen 03/18/2023 Non-Reactive     HIV-1 Antibody 03/18/2023 Non-Reactive     HIV-2 Antibody 03/18/2023 Non-Reactive     HIV Ag-Ab 5th Gen 03/18/2023 Non-Reactive     Syphilis Total Antibody 03/18/2023 Non-reactive        Suicide/Homicide Risk Assessment:    The following interventions are recommended: contracts for safety at present - agrees to go to ED if feeling unsafe, contracts for safety at present - agrees to call Crisis Intervention Service if feeling unsafe. Although patient's acute lethality risk is low, long-term/chronic lethality risk is mildly elevated in the presence of depression. At the current moment, Makayla is future-oriented, forward-thinking, and demonstrates ability to act in a self-preserving manner as evidenced by volitionally presenting to the clinic today, seeking treatment. To mitigate future risk, patient should adhere to the recommendations below, avoid alcohol/illicit substance use, utilize community-based resources and familiar support and prioritize mental health treatment. Presently, patient denies active suicidal/homicidal ideation in addition to thoughts of self-injury; contracts for safety.  At conclusion of evaluation, patient is amenable to the recommendations below. Patient is amenable to calling/contacting the outpatient office including this writer if any acute adverse effects of their medication regimen arise in addition to any comments or concerns pertaining to their psychiatric management.  Patient is amenable to calling/contacting crisis and/or attending to the nearest emergency department if their clinical condition deteriorates to assure their safety and stability, stating that they are able to appropriately confide in their  regarding their psychiatric  state.    Assessment/Plan:     Diagnoses and all orders for this visit:    Panic attacks  -     clonazePAM (KlonoPIN) 0.5 mg tablet; Take one to two tablets daily at bedtime as needed for insomnia    Postpartum depression  -     escitalopram (LEXAPRO) 5 mg tablet; Take 1 tablet (5 mg total) by mouth daily Take with 10mg tablet for total of 15mg.  -     escitalopram (LEXAPRO) 10 mg tablet; Take 1 tablet (10 mg total) by mouth daily  -     buPROPion (Wellbutrin SR) 150 mg 12 hr tablet; Take 1 tablet (150 mg total) by mouth in the morning      Makayla is a 33 year old female who is struggling with post-partum depression.  She had some depression and anxiety in the past, but symptoms have been made significantly worse by the death of her son in utero.  She is still struggling with some nightmares and flashbacks related to the incident, but improving somewhat as she has been better able to distract herself with work.  Wellbutrin seems to have helped her symptoms somewhat, may be experiencing some insomnia related to the medication.  She seems to be making progress, has a lot of support, and does not appear to be in acute danger to herself or others at this time.    Treatment Recommendations/Precautions:    Will continue lexapro 15mg daily for treatment of her depression and anxiety.   Will continue klonopin, but increase to 1mg, advised to use as needed but can use nightly until next appointment if necessary due to intense emotional distress of her grief.   Will continue with Wellbutrin  mg daily.  Medication management every 4 weeks  Continue psychotherapy with own therapist  Aware of 24 hour and weekend coverage for urgent situations accessed by calling [facility] main practice number  Patient advised to call 911 if feeling suicidal or homicidal before acting out on their thoughts and they expressed understanding.  Medications Risks/Benefits      Risks, Benefits And Possible Side Effects Of  Medications:    Risks, benefits, and possible side effects of medications explained to Makayla and she verbalizes understanding and agreement for treatment. including: Risks and potential side effects of medication discussed with patient including serotonin syndrome, SIADH, worsening depression, suicidality, induction of zay, GI upset, headaches, activation, sexual side effects, sedation, potential drug interactions, and others. Patient expressed understanding.   .     Controlled Medication Discussion:     Discussed with Makayla the risks of sedation, respiratory depression, impairment of ability to drive and potential for abuse and addiction related to treatment with benzodiazepine medications. She understands risk of treatment with benzodiazepine medications, agrees to not drive if feels impaired and agrees to take medications as prescribed    Psychotherapy Provided:     Individual psychotherapy provided: Yes  Counseling was provided during the session today for 18 minutes.  Medication education provided to Makayla.  Discussed with Makayla coping with death of son, occasional anxiety, and spending time alone .   Coping techniques including cognitive restructuring, getting into a good routine, make a gratitude list, making a list of future goals, meditation, mindfulness, talking to family, and scheduling bereavement  reviewed with Makayla.   Reassurance and supportive therapy provided.          Treatment Plan:    Completed and signed during the session: Not applicable - Treatment Plan not due at this session    This note was not shared with the patient due to this is a psychotherapy note      Karl Waldrop, DO 01/03/24

## 2024-01-09 ENCOUNTER — TELEMEDICINE (OUTPATIENT)
Dept: BEHAVIORAL/MENTAL HEALTH CLINIC | Facility: CLINIC | Age: 34
End: 2024-01-09
Payer: COMMERCIAL

## 2024-01-09 DIAGNOSIS — F41.9 ANXIETY: ICD-10-CM

## 2024-01-09 DIAGNOSIS — F43.10 PTSD (POST-TRAUMATIC STRESS DISORDER): ICD-10-CM

## 2024-01-09 DIAGNOSIS — F32.1 CURRENT MODERATE EPISODE OF MAJOR DEPRESSIVE DISORDER WITHOUT PRIOR EPISODE (HCC): Primary | ICD-10-CM

## 2024-01-09 PROCEDURE — 90832 PSYTX W PT 30 MINUTES: CPT | Performed by: SOCIAL WORKER

## 2024-01-09 NOTE — PSYCH
"  Virtual Regular Visit    Verification of patient location:    Patient is located at Other in the following state in which I hold an active license [place]      Assessment/Plan:    Problem List Items Addressed This Visit          Other    Depression - Primary     Other Visit Diagnoses       Anxiety        PTSD (post-traumatic stress disorder)                Goals addressed in session: Goal 1          Reason for visit is No chief complaint on file.       Encounter provider Kori Das LCSW    Provider located at [facility] [address] [place] [address] [phone]      Recent Visits  No visits were found meeting these conditions.  Showing recent visits within past 7 days and meeting all other requirements  Today's Visits  Date Type Provider Dept   01/09/24 Telemedicine Kori Das LCSW [facility] [place]   Showing today's visits and meeting all other requirements  Future Appointments  No visits were found meeting these conditions.  Showing future appointments within next 150 days and meeting all other requirements       The patient was identified by name and date of birth. Makayla Koenig was informed that this is a telemedicine visit and that the visit is being conducted throughthe Epic Embedded platform. She agrees to proceed..  My office door was closed. No one else was in the room.  She acknowledged consent and understanding of privacy and security of the video platform. The patient has agreed to participate and understands they can discontinue the visit at any time.    Patient is aware this is a billable service.     Subjective  Makayla Koenig is a 33 y.o. female who presented for follow up therapy session .  Pt reported that she and her family are recovering from the FLU.  Pt reported she had her \"ups and downs\" throughout the holidays.  She is now having more good days than bad days.  She struggles " most in the evenings.  Pt does not feel a need for continued therapy at this time.  Agreed to discharge pt as she has improved.       HPI     Past Medical History:   Diagnosis Date    Benign liver cyst     Class 2 obesity due to excess calories with body mass index (BMI) of 35.0 to 35.9 in adult 06/13/2022    Depression     Ovarian cyst     PONV (postoperative nausea and vomiting)     Seasonal allergies        Past Surgical History:   Procedure Laterality Date    APPENDECTOMY      CHOLECYSTECTOMY  11/20/2014    DILATION AND CURETTAGE OF UTERUS  06/01/2023    Emergency - [facility]    HERNIA REPAIR  2009    x3    LIVER SURGERY  11/20/2014    liver cyst resection with removal of lesion    OVARIAN CYST REMOVAL Right 2006    [place] HYSTEROSCOPY BX ENDOMETRIUM&/POLYPC W/WO D&C N/A 07/21/2022    Procedure: HYSTEROSCOPY, BIOPSY;  Surgeon: Tara Budinetz, DO;  Location: [facility];  Service: Gynecology    AZ LAPAROSCOPY W/RMVL ADNEXAL STRUCTURES N/A 07/21/2022    Procedure: LAPAROSCOPY; FULGERATION OF ENDOMETRIOSIS; DRAINAGE OF B/L OVARIAN CYSTS;;  Surgeon: Tara Budinetz, DO;  Location: [facility];  Service: Gynecology       Current Outpatient Medications   Medication Sig Dispense Refill    buPROPion (Wellbutrin SR) 150 mg 12 hr tablet Take 1 tablet (150 mg total) by mouth in the morning 90 tablet 1    clonazePAM (KlonoPIN) 0.5 mg tablet Take one to two tablets daily at bedtime as needed for insomnia 30 tablet 0    escitalopram (LEXAPRO) 10 mg tablet Take 1 tablet (10 mg total) by mouth daily 30 tablet 2    escitalopram (LEXAPRO) 5 mg tablet Take 1 tablet (5 mg total) by mouth daily Take with 10mg tablet for total of 15mg. 30 tablet 1    meclizine (ANTIVERT) 12.5 MG tablet Take 1 tablet (12.5 mg total) by mouth every 12 (twelve) hours as needed for dizziness 30 tablet 0    metFORMIN (GLUCOPHAGE-XR) 500 mg 24 hr tablet Take 2 tablets (1,000 mg total) by mouth 2 (two) times a day with meals 360 tablet 1     "ondansetron (ZOFRAN) 4 mg tablet Take 1 tablet (4 mg total) by mouth every 8 (eight) hours as needed for nausea or vomiting for up to 3 days 10 tablet 0     No current facility-administered medications for this visit.        Allergies   Allergen Reactions    Mirtazapine Irritability     Suicidal thoughts    Amoxicillin Hives       Review of Systems    Video Exam    There were no vitals filed for this visit.    Physical Exam     Behavioral Health Psychotherapy Progress Note    Psychotherapy Provided: Individual Psychotherapy     1. Current moderate episode of major depressive disorder without prior episode (HCC)        2. Anxiety        3. PTSD (post-traumatic stress disorder)            Goals addressed in session: Goal 1     DATA:   During this session, this clinician used the following therapeutic modalities: Engagement Strategies, Bereavement Therapy, Client-centered Therapy, Mindfulness-based Strategies, and Supportive Psychotherapy    Substance Abuse was not addressed during this session. If the client is diagnosed with a co-occurring substance use disorder, please indicate any changes in the frequency or amount of use: na. Stage of change for addressing substance use diagnoses: No substance use/Not applicable    ASSESSMENT:  Makayla Koenig presents with a Euthymic/ normal mood.     her affect is Normal range and intensity, which is congruent, with her mood and the content of the session. The client has made progress on their goals.     Makayla Koenig presents with a none risk of suicide, none risk of self-harm, and none risk of harm to others.    For any risk assessment that surpasses a \"low\" rating, a safety plan must be developed.    A safety plan was indicated: no  If yes, describe in detail na    PLAN: DISCHARGE    Behavioral Health Treatment Plan and Discharge Planning: Makayla Koenig is aware of and agrees to continue to work on their treatment plan. They have identified and are working toward their discharge " goals. yes    Psychotherapy Discharge Summary    Preferred Name: Makayla Koenig  YOB: 1990    Admission date to psychotherapy: 7/25/23    Referred by: Dima Maldonado DO    Presenting Problem: Anxiety, Depression, PTSD    Course of treatment included : individual therapy     Progress/Outcome of Treatment Goals (brief summary of course of treatment) Pt attended all scheduled therapy sessions.  Provided bereavement therapy for her pregnancy loss at 27w0d.      Treatment Complications (if any): NA    Treatment Progress: good    Current [facility] Provider: Karl Waldrop DO     Discharge Medications include: Klonopin, 0.5mg BID; Lexapro 15mg QD; Wellbutrin 150mg QD    Discharge Date: 1/9/24    Discharge Diagnosis:   1. Current moderate episode of major depressive disorder without prior episode (HCC)        2. Anxiety        3. PTSD (post-traumatic stress disorder)            Criteria for Discharge: completed treatment goals and objectives and is no longer in need of services    Aftercare recommendations include (include specific referral names and phone numbers, if appropriate): Pt may call for services in the future if necessary    Prognosis: good      Visit start and stop times:    01/09/24  Start Time: 1057  Stop Time: 1114  Total Visit Time: 17 minutes

## 2024-02-05 ENCOUNTER — TELEMEDICINE (OUTPATIENT)
Dept: PSYCHIATRY | Facility: CLINIC | Age: 34
End: 2024-02-05
Payer: COMMERCIAL

## 2024-02-05 DIAGNOSIS — F43.21 GRIEF ASSOCIATED WITH LOSS OF FETUS: ICD-10-CM

## 2024-02-05 DIAGNOSIS — F41.0 PANIC ATTACKS: Primary | ICD-10-CM

## 2024-02-05 PROCEDURE — 99214 OFFICE O/P EST MOD 30 MIN: CPT | Performed by: STUDENT IN AN ORGANIZED HEALTH CARE EDUCATION/TRAINING PROGRAM

## 2024-02-05 PROCEDURE — 90833 PSYTX W PT W E/M 30 MIN: CPT | Performed by: STUDENT IN AN ORGANIZED HEALTH CARE EDUCATION/TRAINING PROGRAM

## 2024-02-05 RX ORDER — BUSPIRONE HYDROCHLORIDE 5 MG/1
5 TABLET ORAL 3 TIMES DAILY
Qty: 90 TABLET | Refills: 1 | Status: SHIPPED | OUTPATIENT
Start: 2024-02-05

## 2024-02-05 NOTE — PSYCH
Virtual Regular Visit    Verification of patient location:    Patient is located at Other in the following state in which I hold an active license [place]      Assessment/Plan:    Problem List Items Addressed This Visit       Depression    Relevant Medications    busPIRone (BUSPAR) 5 mg tablet     Other Visit Diagnoses       Panic attacks    -  Primary    Relevant Medications    busPIRone (BUSPAR) 5 mg tablet    Grief associated with loss of fetus                   Reason for visit is   Chief Complaint   Patient presents with    Virtual Regular Visit          Encounter provider Karl Waldrop DO    Provider located at  [facility]  [address] [facility] [place] [address] [phone]      Recent Visits  No visits were found meeting these conditions.  Showing recent visits within past 7 days and meeting all other requirements  Today's Visits  Date Type Provider Dept   02/05/24 Telemedicine Karl Waldrop DO [facility]   Showing today's visits and meeting all other requirements  Future Appointments  No visits were found meeting these conditions.  Showing future appointments within next 150 days and meeting all other requirements       The patient was identified by name and date of birth. Makayla Koenig was informed that this is a telemedicine visit and that the visit is being conducted throughthe Epic Embedded platform. She agrees to proceed..  My office door was closed. No one else was in the room.  She acknowledged consent and understanding of privacy and security of the video platform. The patient has agreed to participate and understands they can discontinue the visit at any time.    Patient is aware this is a billable service.     MEDICATION MANAGEMENT NOTE        [facility]      Name and Date of Birth:  Makayla Koenig 34 y.o. 1990 MRN: [id number]    Date of Visit:  "02/05/2024    Visit Time    Visit Start Time: 1300  Visit Stop Time: 1330  Total Visit Duration:  30 minutes      Reason for Visit: Follow-up visit regarding medication management     Chief Complaint: \"I feel okay, just had some issues with my .\"    SUBJECTIVE:    Makayla Koenig is a 34 y.o., female, possessing a past psychiatric history significant for anxiety, who recently experienced significant stress following the in-utero fetal demise and having to deliver still born baby at 7 months in June 2023, who was personally seen and evaluated at the [facility] outpatient clinic for follow-up regarding medication management. Makayla is currently prescribed Wellbutrin  mg daily, lexapro 15mg daily and klonopin 0.5mg qHS PRN anxiety.   During interview today, Makayla states she is feeling \"alright\".  States that she is at work today, and does find work helpful.  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  States that she enjoys spending time with her family.  She admits that last week was somewhat exhausting for her with work, and on Friday, after she dropped off her daughter at her daughter's father's house, she was spending a lot of time at home alone.  She states that her  spend time with his friends, and came home intoxicated and was upset with her that she is spending some time at home.  She admits that he made comments that he would have left her if they were not .  She states that she was upset with him on Saturday, and he tried to \"act like nothing it happened \".  She states that she is trying to talk with her , but admits that they have not been intimate for over a month and a half.  She states that she has less desire and low libido.  We discussed that this could be due to her medication, though she admits that she is afraid of getting pregnant.  We discussed that she could try " "BuSpar to help mitigate this side effect possibly from her Lexapro.  She admits that she has not been taking the Klonopin every night, as she is worried about getting addicted.  She does state that it is helpful when she needs it, and she had been feeling more anxious this weekend and had some trouble sleeping.  She denies thoughts to herself or anyone else, denies any hallucinations.  She states she sometimes wonders how things would be if her son had survived, and feels \"he should have been here for this \".  However, she states she is trying to work on not feeling this way, and admits that she is graduated from therapy.      Current Rating Scores:   None completed today.    Psychiatric Review Of Systems:    Appetite: no  Adverse eating: no  Weight changes: no  Insomnia/sleeplessness: no  Fatigue/anergy: yes  Anhedonia/lack of interest: no, improving  Attention/concentration: no change  Psychomotor agitation/retardation: no  Somatic symptoms: no  Anxiety/panic attack: panic attacks, worrying are lessening.   Ana María/hypomania: no  Hopelessness/helplessness/worthlessness: feeling more in control.   Self-injurious behavior/high-risk behavior: no  Suicidal ideation: no  Homicidal ideation: no  Auditory hallucinations: no  Visual hallucinations: no  Other perceptual disturbances: no  Delusional thinking: no  Obsessive/compulsive symptoms: no    Review Of Systems:      Constitutional negative   ENT negative   Cardiovascular negative   Respiratory negative   Gastrointestinal negative   Genitourinary negative   Musculoskeletal negative   Integumentary negative   Neurological negative   Endocrine negative   Other Symptoms none, all other systems are negative     History Review:   The following portions of the patient's history were reviewed and updated as appropriate: allergies, current medications, past family history, past medical history, past social history, past surgical history and problem list..         OBJECTIVE: "     Vital signs in last 24 hours:    There were no vitals filed for this visit.    Mental Status Evaluation:    Appearance age appropriate, casually dressed   Behavior cooperative, appears anxious, slightly more calm   Speech normal rate, normal volume, normal pitch   Mood less anxious, less dysphoric   Affect brighter, less constricted   Thought Processes organized, goal directed   Associations intact associations, less perseverative on her son's death   Thought Content no overt delusions   Perceptual Disturbances: no auditory hallucinations, no visual hallucinations   Abnormal Thoughts  Risk Potential Suicidal ideation - None  Homicidal ideation - None  Potential for aggression - No   Orientation oriented to person, place, time/date and situation   Memory recent and remote memory grossly intact   Consciousness alert and awake   Attention Span Concentration Span attention span and concentration are age appropriate   Intellect appears to be of average intelligence   Insight intact   Judgement intact   Muscle Strength and  Gait normal muscle strength and normal muscle tone, normal gait and normal balance   Motor activity no abnormal movements   Fund of Knowledge adequate knowledge of current events  adequate fund of knowledge regarding past history  adequate fund of knowledge regarding vocabulary    Pain none   Pain Scale 0       Laboratory Results: I have personally reviewed all pertinent laboratory/tests results    Recent Labs (last 2 months):   No visits with results within 2 Month(s) from this visit.   Latest known visit with results is:   Appointment on 03/18/2023   Component Date Value    ABO Grouping 03/18/2023 B     Rh Factor 03/18/2023 Positive     Antibody Screen 03/18/2023 Negative     Specimen Expiration Date 03/18/2023 [id number]     Urine Culture 03/18/2023 No Growth <1000 cfu/mL     WBC 03/18/2023 6.76     RBC 03/18/2023 4.28     Hemoglobin 03/18/2023 13.0     Hematocrit 03/18/2023 38.8     MCV  03/18/2023 91     MCH 03/18/2023 30.4     MCHC 03/18/2023 33.5     RDW 03/18/2023 13.7     MPV 03/18/2023 11.0     Platelets 03/18/2023 225     nRBC 03/18/2023 0     Neutrophils Relative 03/18/2023 72     Immat GRANS % 03/18/2023 0     Lymphocytes Relative 03/18/2023 23     Monocytes Relative 03/18/2023 4     Eosinophils Relative 03/18/2023 1     Basophils Relative 03/18/2023 0     Neutrophils Absolute 03/18/2023 4.84     Immature Grans Absolute 03/18/2023 0.02     Lymphocytes Absolute 03/18/2023 1.58     Monocytes Absolute 03/18/2023 0.27     Eosinophils Absolute 03/18/2023 0.04     Basophils Absolute 03/18/2023 0.01     Rubella IgG Quant 03/18/2023 109.6     Hep B S Ab 03/18/2023 5.06     Hepatitis B Surface Ag 03/18/2023 Non-reactive     Hepatitis C Ab 03/18/2023 Non-reactive     HIV-1 p24 Antigen 03/18/2023 Non-Reactive     HIV-1 Antibody 03/18/2023 Non-Reactive     HIV-2 Antibody 03/18/2023 Non-Reactive     HIV Ag-Ab 5th Gen 03/18/2023 Non-Reactive     Syphilis Total Antibody 03/18/2023 Non-reactive        Suicide/Homicide Risk Assessment:    The following interventions are recommended: contracts for safety at present - agrees to go to ED if feeling unsafe, contracts for safety at present - agrees to call Crisis Intervention Service if feeling unsafe. Although patient's acute lethality risk is low, long-term/chronic lethality risk is mildly elevated in the presence of depression. At the current moment, Makayla is future-oriented, forward-thinking, and demonstrates ability to act in a self-preserving manner as evidenced by volitionally presenting to the clinic today, seeking treatment. To mitigate future risk, patient should adhere to the recommendations below, avoid alcohol/illicit substance use, utilize community-based resources and familiar support and prioritize mental health treatment. Presently, patient denies active suicidal/homicidal ideation in addition to thoughts of self-injury; contracts for safety.   At conclusion of evaluation, patient is amenable to the recommendations below. Patient is amenable to calling/contacting the outpatient office including this writer if any acute adverse effects of their medication regimen arise in addition to any comments or concerns pertaining to their psychiatric management.  Patient is amenable to calling/contacting crisis and/or attending to the nearest emergency department if their clinical condition deteriorates to assure their safety and stability, stating that they are able to appropriately confide in their  regarding their psychiatric state.    Assessment/Plan:     Diagnoses and all orders for this visit:    Panic attacks  -     busPIRone (BUSPAR) 5 mg tablet; Take 1 tablet (5 mg total) by mouth 3 (three) times a day    Postpartum depression    Grief associated with loss of fetus      Makayla is a 33 year old female who is struggling with post-partum depression.  She had some depression and anxiety in the past, but symptoms have been made significantly worse by the death of her son in utero.  She is still struggling with some nightmares and flashbacks related to the incident, but improving somewhat as she has been better able to distract herself with work.  Wellbutrin seems to have helped her symptoms somewhat, may be experiencing some insomnia related to the medication.  She seems to be making progress, has a lot of support, and does not appear to be in acute danger to herself or others at this time.    Treatment Recommendations/Precautions:    Will continue lexapro 15mg daily for treatment of her depression and anxiety, but will augment with buspar 5mg TID for treatment of her anxiety.    Will continue klonopin 0.5mg qHS PRN anxiety/insomnia.    Will continue with Wellbutrin  mg daily.  Medication management every 4 weeks  Continue psychotherapy with own therapist  Aware of 24 hour and weekend coverage for urgent situations accessed by calling [facility] main practice number  Patient advised to call 911 if feeling suicidal or homicidal before acting out on their thoughts and they expressed understanding.  Medications Risks/Benefits      Risks, Benefits And Possible Side Effects Of Medications:    Risks, benefits, and possible side effects of medications explained to Makayla and she verbalizes understanding and agreement for treatment. including: Risks and potential side effects of medication discussed with patient including serotonin syndrome, SIADH, worsening depression, suicidality, induction of zay, GI upset, headaches, activation, sexual side effects, sedation, potential drug interactions, and others. Patient expressed understanding.   .     Controlled Medication Discussion:     Discussed with Makayla the risks of sedation, respiratory depression, impairment of ability to drive and potential for abuse and addiction related to treatment with benzodiazepine medications. She understands risk of treatment with benzodiazepine medications, agrees to not drive if feels impaired and agrees to take medications as prescribed    Psychotherapy Provided:     Individual psychotherapy provided: Yes  Counseling was provided during the session today for 18 minutes.  Medication education provided to Makayla.  Discussed with Makayla coping with death of son, occasional anxiety, and spending time alone and relationship stress .   Coping techniques including cognitive restructuring, getting into a good routine, make a gratitude list, making a list of future goals, meditation, mindfulness, talking to family, and scheduling bereavement  reviewed with Makayla.   Reassurance and supportive therapy provided.          Treatment Plan:    Completed and signed during the session: Not applicable - Treatment Plan not due at this session    This note was not shared with the patient due to this is a psychotherapy note      Karl Waldrop,  02/06/24

## 2024-04-23 ENCOUNTER — VBI (OUTPATIENT)
Dept: ADMINISTRATIVE | Facility: OTHER | Age: 34
End: 2024-04-23

## 2024-08-26 ENCOUNTER — VBI (OUTPATIENT)
Dept: ADMINISTRATIVE | Facility: OTHER | Age: 34
End: 2024-08-26

## 2024-08-26 NOTE — TELEPHONE ENCOUNTER
08/26/24 10:21 AM     Chart reviewed for Pap Smear (HPV) aka Cervical Cancer Screening ; nothing is submitted to the patient's insurance at this time.     RHONDA BOYD MA   [facility]

## 2024-11-18 ENCOUNTER — TELEPHONE (OUTPATIENT)
Age: 34
End: 2024-11-18

## 2024-11-18 ENCOUNTER — CLINICAL SUPPORT (OUTPATIENT)
Dept: FAMILY MEDICINE CLINIC | Facility: CLINIC | Age: 34
End: 2024-11-18
Payer: COMMERCIAL

## 2024-11-18 DIAGNOSIS — Z11.1 SCREENING FOR TUBERCULOSIS: Primary | ICD-10-CM

## 2024-11-18 PROCEDURE — 86580 TB INTRADERMAL TEST: CPT | Performed by: FAMILY MEDICINE

## 2024-11-18 NOTE — TELEPHONE ENCOUNTER
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  She needs an updated ppd.      Can an order be written for a ppd?    Please advise and notify.    Thank you.

## 2024-11-19 ENCOUNTER — TELEPHONE (OUTPATIENT)
Age: 34
End: 2024-11-19

## 2024-11-19 NOTE — TELEPHONE ENCOUNTER
Patient called asking if her annual physical appt can be moved up to this week instead of 12/5.  I was having trouble in the system and then was unable to get a call through to the office.  Can you please call her and let her know if she can come in this week for her physical.  Thank you.

## 2024-11-20 LAB
INDURATION: 0 MM
TB SKIN TEST: NEGATIVE

## 2024-11-25 ENCOUNTER — RA CDI HCC (OUTPATIENT)
Dept: OTHER | Facility: HOSPITAL | Age: 34
End: 2024-11-25

## 2024-12-05 ENCOUNTER — OFFICE VISIT (OUTPATIENT)
Dept: FAMILY MEDICINE CLINIC | Facility: CLINIC | Age: 34
End: 2024-12-05
Payer: COMMERCIAL

## 2024-12-05 VITALS
BODY MASS INDEX: 35.49 KG/M2 | DIASTOLIC BLOOD PRESSURE: 78 MMHG | TEMPERATURE: 97.5 F | HEIGHT: 65 IN | RESPIRATION RATE: 18 BRPM | OXYGEN SATURATION: 99 % | SYSTOLIC BLOOD PRESSURE: 120 MMHG | WEIGHT: 213 LBS | HEART RATE: 107 BPM

## 2024-12-05 DIAGNOSIS — Z00.00 ANNUAL PHYSICAL EXAM: Primary | ICD-10-CM

## 2024-12-05 PROBLEM — Z87.59 HISTORY OF STILLBIRTH: Status: ACTIVE | Noted: 2024-12-05

## 2024-12-05 PROCEDURE — 99395 PREV VISIT EST AGE 18-39: CPT | Performed by: PHYSICIAN ASSISTANT

## 2024-12-05 NOTE — PROGRESS NOTES
"Adult Annual Physical  Name: Makayla Koenig      : 1990      MRN: [id number]  Encounter Provider: Belén Antunez PA-C  Encounter Date: 2024   Encounter department: [facility]    Assessment & Plan  Annual physical exam           Immunizations and preventive care screenings were discussed with patient today. Appropriate education was printed on patient's after visit summary.    Counseling:  Dental Health: discussed importance of regular tooth brushing, flossing, and dental visits.      Depression Screening and Follow-up Plan: Patient was screened for depression during today's encounter. They screened negative with a PHQ-9 score of 0.        History of Present Illness     Adult Annual Physical:  Patient presents for annual physical. Makayla is here today for annual visit. No new complaints at this time..     Depression Screening:    - PHQ-9 Score: 0    General Health:  - Sleep: sleeps well.  - Hearing: normal hearing right ear and normal hearing left ear.  - Dental: brushes teeth once daily.    /GYN Health:  - Follows with GYN: yes.     Review of Systems   Constitutional:  Negative for chills and fever.   HENT:  Negative for ear pain and sore throat.    Eyes:  Negative for pain and visual disturbance.   Respiratory:  Negative for cough and shortness of breath.    Cardiovascular:  Negative for chest pain and palpitations.   Gastrointestinal:  Negative for abdominal pain and vomiting.   Genitourinary:  Negative for dysuria and hematuria.   Musculoskeletal:  Negative for arthralgias and back pain.   Skin:  Negative for color change and rash.   Neurological:  Negative for seizures and syncope.   All other systems reviewed and are negative.        Objective   /78 (BP Location: Left arm, Patient Position: Sitting, Cuff Size: Standard)   Pulse (!) 107   Temp 97.5 °F (36.4 °C) (Temporal)   Resp 18   Ht 5' 5\" (1.651 m)   Wt 96.6 kg (213 lb)   SpO2 99%   BMI 35.45 kg/m²     Physical Exam  Vitals " and nursing note reviewed.   Constitutional:       General: She is not in acute distress.     Appearance: She is well-developed. She is obese.   HENT:      Head: Normocephalic and atraumatic.   Eyes:      Conjunctiva/sclera: Conjunctivae normal.   Cardiovascular:      Rate and Rhythm: Normal rate and regular rhythm.      Heart sounds: No murmur heard.  Pulmonary:      Effort: Pulmonary effort is normal. No respiratory distress.      Breath sounds: Normal breath sounds.   Abdominal:      Palpations: Abdomen is soft.      Tenderness: There is no abdominal tenderness.   Musculoskeletal:         General: No swelling.      Cervical back: Neck supple.   Skin:     General: Skin is warm and dry.      Capillary Refill: Capillary refill takes less than 2 seconds.   Neurological:      Mental Status: She is alert.   Psychiatric:         Mood and Affect: Mood normal.

## 2024-12-05 NOTE — PATIENT INSTRUCTIONS
"Patient Education     Routine physical for adults   The Basics   Written by the doctors and editors at [facility]   What is a physical? -- A physical is a routine visit, or \"check-up,\" with your doctor. You might also hear it called a \"wellness visit\" or \"preventive visit.\"  During each visit, the doctor will:   Ask about your physical and mental health   Ask about your habits, behaviors, and lifestyle   Do an exam   Give you vaccines if needed   Talk to you about any medicines you take   Give advice about your health   Answer your questions  Getting regular check-ups is an important part of taking care of your health. It can help your doctor find and treat any problems you have. But it's also important for preventing health problems.  A routine physical is different from a \"sick visit.\" A sick visit is when you see a doctor because of a health concern or problem. Since physicals are scheduled ahead of time, you can think about what you want to ask the doctor.  How often should I get a physical? -- It depends on your age and health. In general, for people age 21 years and older:   If you are younger than 50 years, you might be able to get a physical every 3 years.   If you are 50 years or older, your doctor might recommend a physical every year.  If you have an ongoing health condition, like diabetes or high blood pressure, your doctor will probably want to see you more often.  What happens during a physical? -- In general, each visit will include:   Physical exam - The doctor or nurse will check your height, weight, heart rate, and blood pressure. They will also look at your eyes and ears. They will ask about how you are feeling and whether you have any symptoms that bother you.   Medicines - It's a good idea to bring a list of all the medicines you take to each doctor visit. Your doctor will talk to you about your medicines and answer any questions. Tell them if you are having any side effects that bother you. You " "should also tell them if you are having trouble paying for any of your medicines.   Habits and behaviors - This includes:   Your diet   Your exercise habits   Whether you smoke, drink alcohol, or use drugs   Whether you are sexually active   Whether you feel safe at home  Your doctor will talk to you about things you can do to improve your health and lower your risk of health problems. They will also offer help and support. For example, if you want to quit smoking, they can give you advice and might prescribe medicines. If you want to improve your diet or get more physical activity, they can help you with this, too.   Lab tests, if needed - The tests you get will depend on your age and situation. For example, your doctor might want to check your:   Cholesterol   Blood sugar   Iron level   Vaccines - The recommended vaccines will depend on your age, health, and what vaccines you already had. Vaccines are very important because they can prevent certain serious or deadly infections.   Discussion of screening - \"Screening\" means checking for diseases or other health problems before they cause symptoms. Your doctor can recommend screening based on your age, risk, and preferences. This might include tests to check for:   Cancer, such as breast, prostate, cervical, ovarian, colorectal, prostate, lung, or skin cancer   Sexually transmitted infections, such as chlamydia and gonorrhea   Mental health conditions like depression and anxiety  Your doctor will talk to you about the different types of screening tests. They can help you decide which screenings to have. They can also explain what the results might mean.   Answering questions - The physical is a good time to ask the doctor or nurse questions about your health. If needed, they can refer you to other doctors or specialists, too.  Adults older than 65 years often need other care, too. As you get older, your doctor will talk to you about:   How to prevent falling at " home   Hearing or vision tests   Memory testing   How to take your medicines safely   Making sure that you have the help and support you need at home  All topics are updated as new evidence becomes available and our peer review process is complete.  This topic retrieved from [employer] on: May 02, 2024.  Topic 100353 Version 1.0  Release: 32.4.3 - C32.122  © 2024 UpToDate, Inc. and/or its affiliates. All rights reserved.  Consumer Information Use and Disclaimer   Disclaimer: This generalized information is a limited summary of diagnosis, treatment, and/or medication information. It is not meant to be comprehensive and should be used as a tool to help the user understand and/or assess potential diagnostic and treatment options. It does NOT include all information about conditions, treatments, medications, side effects, or risks that may apply to a specific patient. It is not intended to be medical advice or a substitute for the medical advice, diagnosis, or treatment of a health care provider based on the health care provider's examination and assessment of a patient's specific and unique circumstances. Patients must speak with a health care provider for complete information about their health, medical questions, and treatment options, including any risks or benefits regarding use of medications. This information does not endorse any treatments or medications as safe, effective, or approved for treating a specific patient. UpToDate, Inc. and its affiliates disclaim any warranty or liability relating to this information or the use thereof.The use of this information is governed by the Terms of Use, available at https://www.wolterslistedplacesuwer.com/en/know/clinical-effectiveness-terms. 2024© UpToDate, Inc. and its affiliates and/or licensors. All rights reserved.  Copyright   © 2024 UpToDate, Inc. and/or its affiliates. All rights reserved.

## 2024-12-26 ENCOUNTER — VBI (OUTPATIENT)
Dept: ADMINISTRATIVE | Facility: OTHER | Age: 34
End: 2024-12-26

## 2024-12-26 NOTE — TELEPHONE ENCOUNTER
12/26/24 4:42 PM     Chart reviewed for Pap Smear (HPV) aka Cervical Cancer Screening ; nothing is submitted to the patient's insurance at this time.     Lora Kumar MA   [facility]

## 2025-02-03 ENCOUNTER — APPOINTMENT (OUTPATIENT)
Dept: LAB | Facility: MEDICAL CENTER | Age: 35
End: 2025-02-03
Payer: COMMERCIAL

## 2025-02-03 DIAGNOSIS — R30.0 DYSURIA: ICD-10-CM

## 2025-02-03 LAB
BACTERIA UR QL AUTO: ABNORMAL /HPF
BILIRUB UR QL STRIP: ABNORMAL
CLARITY UR: ABNORMAL
COLOR UR: ABNORMAL
GLUCOSE UR STRIP-MCNC: NEGATIVE MG/DL
HGB UR QL STRIP.AUTO: ABNORMAL
KETONES UR STRIP-MCNC: NEGATIVE MG/DL
LEUKOCYTE ESTERASE UR QL STRIP: NEGATIVE
NITRITE UR QL STRIP: POSITIVE
NON-SQ EPI CELLS URNS QL MICRO: ABNORMAL /HPF
PH UR STRIP.AUTO: 6 [PH]
PROT UR STRIP-MCNC: NEGATIVE MG/DL
RBC #/AREA URNS AUTO: ABNORMAL /HPF
SP GR UR STRIP.AUTO: 1.01 (ref 1–1.03)
UROBILINOGEN UR STRIP-ACNC: 2 MG/DL
WBC #/AREA URNS AUTO: ABNORMAL /HPF

## 2025-02-03 PROCEDURE — 81001 URINALYSIS AUTO W/SCOPE: CPT

## 2025-02-03 PROCEDURE — 87086 URINE CULTURE/COLONY COUNT: CPT

## 2025-02-04 LAB — BACTERIA UR CULT: NORMAL

## 2025-03-02 ENCOUNTER — OFFICE VISIT (OUTPATIENT)
Dept: URGENT CARE | Facility: MEDICAL CENTER | Age: 35
End: 2025-03-02
Payer: COMMERCIAL

## 2025-03-02 VITALS
RESPIRATION RATE: 20 BRPM | DIASTOLIC BLOOD PRESSURE: 72 MMHG | TEMPERATURE: 98 F | HEIGHT: 65 IN | BODY MASS INDEX: 34.49 KG/M2 | OXYGEN SATURATION: 96 % | SYSTOLIC BLOOD PRESSURE: 125 MMHG | HEART RATE: 122 BPM | WEIGHT: 207 LBS

## 2025-03-02 DIAGNOSIS — J02.9 SORE THROAT: ICD-10-CM

## 2025-03-02 DIAGNOSIS — R68.89 FLU-LIKE SYMPTOMS: Primary | ICD-10-CM

## 2025-03-02 LAB — S PYO AG THROAT QL: NEGATIVE

## 2025-03-02 PROCEDURE — S9083 URGENT CARE CENTER GLOBAL: HCPCS | Performed by: PHYSICIAN ASSISTANT

## 2025-03-02 PROCEDURE — 87147 CULTURE TYPE IMMUNOLOGIC: CPT | Performed by: PHYSICIAN ASSISTANT

## 2025-03-02 PROCEDURE — G0382 LEV 3 HOSP TYPE B ED VISIT: HCPCS | Performed by: PHYSICIAN ASSISTANT

## 2025-03-02 PROCEDURE — 87070 CULTURE OTHR SPECIMN AEROBIC: CPT | Performed by: PHYSICIAN ASSISTANT

## 2025-03-02 RX ORDER — OSELTAMIVIR PHOSPHATE 75 MG/1
75 CAPSULE ORAL EVERY 12 HOURS SCHEDULED
Qty: 10 CAPSULE | Refills: 0 | Status: SHIPPED | OUTPATIENT
Start: 2025-03-02 | End: 2025-03-07

## 2025-03-02 NOTE — LETTER
March 2, 2025     Patient: Makayla Koenig   YOB: 1990   Date of Visit: 3/2/2025       To Whom It May Concern:    It is my medical opinion that Makayla Koenig may return to work when she is afebrile for 24 hrs without medication due to influenza.    If you have any questions or concerns, please don't hesitate to call.         Sincerely,        Lorie Demarco PA-C    CC: No Recipients

## 2025-03-02 NOTE — PATIENT INSTRUCTIONS
Start Tamiflu  Take Tylenol or Motrin as needed for fever or pain  May take over the counter cold medication to control symptoms  Drink plenty of fluids  Rest  You are contagious until fever resolves  If symptoms worsen go to the ER for further evaluation

## 2025-03-02 NOTE — PROGRESS NOTES
[facility]        NAME: Makayla Koenig is a 35 y.o. female  : 1990    MRN: [id number]  DATE: 2025  TIME: 10:57 AM    Assessment and Plan   Flu-like symptoms [R68.89]  1. Flu-like symptoms  oseltamivir (TAMIFLU) 75 mg capsule      2. Sore throat  POCT rapid ANTIGEN strepA    Throat culture    Throat culture            Patient Instructions     Start Tamiflu  Take Tylenol or Motrin as needed for fever or pain  May take over the counter cold medication to control symptoms  Drink plenty of fluids  Rest  You are contagious until fever resolves  If symptoms worsen go to the ER for further evaluation    Follow up with PCP in 3-5 days.  Proceed to  ER if symptoms worsen.    If tests have been performed at [facility], our office will contact you with results if changes need to be made to the care plan discussed with you at the visit.  You can review your full results on [facility].    Chief Complaint     Chief Complaint   Patient presents with    flu like symptoms     Body aches, fatigue, sore throat and fever and nausea that  started last night          History of Present Illness       Patient started with flu-like symptoms last night.  Patient states they started abruptly.  Her symptoms include chills, fatigue, fevers to a Tmax 103 degrees, stuffy nose, postnasal drip, sore throat, nausea, body aches and headaches.  Fevers have been persistent, they come down with antipyretics but then come right back after several hours.  She had a flu vaccine.  She has been taking Tylenol cold with minimal improvement.  Point-of-care strep test negative. Medical history reviewed.        Review of Systems   Review of Systems   Constitutional:  Positive for chills, fatigue and fever (Tmax 103).   HENT:  Positive for congestion, postnasal drip and sore throat. Negative for rhinorrhea.    Respiratory:  Negative for cough.    Gastrointestinal:  Positive for nausea. Negative for diarrhea and vomiting.    Musculoskeletal:  Positive for myalgias.   Neurological:  Positive for headaches.         Current Medications       Current Outpatient Medications:     buPROPion (Wellbutrin SR) 150 mg 12 hr tablet, Take 1 tablet (150 mg total) by mouth in the morning, Disp: 90 tablet, Rfl: 1    busPIRone (BUSPAR) 5 mg tablet, Take 1 tablet (5 mg total) by mouth 3 (three) times a day, Disp: 90 tablet, Rfl: 1    clonazePAM (KlonoPIN) 0.5 mg tablet, Take one to two tablets daily at bedtime as needed for insomnia, Disp: 30 tablet, Rfl: 0    escitalopram (LEXAPRO) 10 mg tablet, Take 1 tablet (10 mg total) by mouth daily, Disp: 30 tablet, Rfl: 2    escitalopram (LEXAPRO) 5 mg tablet, Take 1 tablet (5 mg total) by mouth daily Take with 10mg tablet for total of 15mg., Disp: 30 tablet, Rfl: 1    meclizine (ANTIVERT) 12.5 MG tablet, Take 1 tablet (12.5 mg total) by mouth every 12 (twelve) hours as needed for dizziness, Disp: 30 tablet, Rfl: 0    oseltamivir (TAMIFLU) 75 mg capsule, Take 1 capsule (75 mg total) by mouth every 12 (twelve) hours for 5 days, Disp: 10 capsule, Rfl: 0    metFORMIN (GLUCOPHAGE-XR) 500 mg 24 hr tablet, Take 2 tablets (1,000 mg total) by mouth 2 (two) times a day with meals, Disp: 360 tablet, Rfl: 1    ondansetron (ZOFRAN) 4 mg tablet, Take 1 tablet (4 mg total) by mouth every 8 (eight) hours as needed for nausea or vomiting for up to 3 days, Disp: 10 tablet, Rfl: 0    Current Allergies     Allergies as of 03/02/2025 - Reviewed 03/02/2025   Allergen Reaction Noted    Mirtazapine Irritability 01/02/2024    Amoxicillin Hives 10/01/2012            The following portions of the patient's history were reviewed and updated as appropriate: allergies, current medications, past family history, past medical history, past social history, past surgical history and problem list.     Past Medical History:   Diagnosis Date    Benign liver cyst     Class 2 obesity due to excess calories with body mass index (BMI) of 35.0 to 35.9  "in adult 06/13/2022    Depression     Ovarian cyst     PONV (postoperative nausea and vomiting)     Seasonal allergies        Past Surgical History:   Procedure Laterality Date    APPENDECTOMY      CHOLECYSTECTOMY  11/20/2014    DILATION AND CURETTAGE OF UTERUS  06/01/2023    Emergency - [facility]    HERNIA REPAIR  2009    x3    LIVER SURGERY  11/20/2014    liver cyst resection with removal of lesion    OVARIAN CYST REMOVAL Right 2006    WI HYSTEROSCOPY BX ENDOMETRIUM&/POLYPC W/WO D&C N/A 07/21/2022    Procedure: HYSTEROSCOPY, BIOPSY;  Surgeon: Tara Budinetz, DO;  Location:  MAIN OR;  Service: Gynecology    WI LAPAROSCOPY W/RMVL ADNEXAL STRUCTURES N/A 07/21/2022    Procedure: LAPAROSCOPY; FULGERATION OF ENDOMETRIOSIS; DRAINAGE OF B/L OVARIAN CYSTS;;  Surgeon: Tara Budinetz, DO;  Location: [facility] MAIN OR;  Service: Gynecology       Family History   Problem Relation Age of Onset    Hypertension Mother     Irritable bowel syndrome Father     Breast cancer Maternal Aunt     Diabetes Maternal Grandfather     Cancer Other          Medications have been verified.        Objective   /72 (BP Location: Left arm, Patient Position: Sitting, Cuff Size: Adult)   Pulse (!) 122   Temp 98 °F (36.7 °C)   Resp 20   Ht 5' 5\" (1.651 m)   Wt 93.9 kg (207 lb)   SpO2 96%   BMI 34.45 kg/m²   No LMP recorded.       Physical Exam     Physical Exam              "

## 2025-03-04 ENCOUNTER — RESULTS FOLLOW-UP (OUTPATIENT)
Dept: URGENT CARE | Facility: MEDICAL CENTER | Age: 35
End: 2025-03-04

## 2025-03-04 DIAGNOSIS — J02.0 STREP PHARYNGITIS: Primary | ICD-10-CM

## 2025-03-04 LAB — BACTERIA THROAT CULT: ABNORMAL

## 2025-03-04 RX ORDER — AZITHROMYCIN 250 MG/1
TABLET, FILM COATED ORAL
Qty: 6 TABLET | Refills: 0 | Status: SHIPPED | OUTPATIENT
Start: 2025-03-04 | End: 2025-03-08

## 2025-04-21 ENCOUNTER — OFFICE VISIT (OUTPATIENT)
Dept: URGENT CARE | Facility: MEDICAL CENTER | Age: 35
End: 2025-04-21
Payer: COMMERCIAL

## 2025-04-21 VITALS
SYSTOLIC BLOOD PRESSURE: 109 MMHG | OXYGEN SATURATION: 99 % | BODY MASS INDEX: 32.82 KG/M2 | RESPIRATION RATE: 20 BRPM | DIASTOLIC BLOOD PRESSURE: 89 MMHG | WEIGHT: 197 LBS | HEART RATE: 100 BPM | HEIGHT: 65 IN | TEMPERATURE: 97.4 F

## 2025-04-21 DIAGNOSIS — J01.90 ACUTE NON-RECURRENT SINUSITIS, UNSPECIFIED LOCATION: Primary | ICD-10-CM

## 2025-04-21 DIAGNOSIS — R05.1 ACUTE COUGH: ICD-10-CM

## 2025-04-21 PROCEDURE — S9083 URGENT CARE CENTER GLOBAL: HCPCS | Performed by: PHYSICIAN ASSISTANT

## 2025-04-21 PROCEDURE — G0382 LEV 3 HOSP TYPE B ED VISIT: HCPCS | Performed by: PHYSICIAN ASSISTANT

## 2025-04-21 RX ORDER — ALBUTEROL SULFATE 90 UG/1
2 INHALANT RESPIRATORY (INHALATION) EVERY 4 HOURS PRN
Qty: 18 G | Refills: 0 | Status: SHIPPED | OUTPATIENT
Start: 2025-04-21 | End: 2025-05-21

## 2025-04-21 RX ORDER — CEFDINIR 300 MG/1
300 CAPSULE ORAL EVERY 12 HOURS SCHEDULED
Qty: 14 CAPSULE | Refills: 0 | Status: SHIPPED | OUTPATIENT
Start: 2025-04-21 | End: 2025-04-28

## 2025-04-21 NOTE — PROGRESS NOTES
[facility]        NAME: Makayla Koenig is a 35 y.o. female  : 1990    MRN: [id number]  DATE: 2025  TIME: 1:19 PM    Assessment and Plan   Acute non-recurrent sinusitis, unspecified location [J01.90]  1. Acute non-recurrent sinusitis, unspecified location  cefdinir (OMNICEF) 300 mg capsule      2. Acute cough  albuterol (PROVENTIL HFA,VENTOLIN HFA) 90 mcg/act inhaler            Patient Instructions     Start antibiotic  Probiotics or yogurt to replace the good bacteria in your gut  Over the counter cold medication to control symptoms  Drink plenty of fluids  If symptoms fail to improve follow up with PCP  If symptoms worsen have yourself rechecked    Follow up with PCP in 3-5 days.  Proceed to  ER if symptoms worsen.    If tests have been performed at [facility], our office will contact you with results if changes need to be made to the care plan discussed with you at the visit.  [facility]    Chief Complaint     Chief Complaint   Patient presents with    Cold Like Symptoms     Sinus pain/ pressure, nasal congestion, dry cough, chest tightness since Friday (3 days).         History of Present Illness       Patient presents with a 3-day history of runny, stuffy nose, sinus pressure and a dry cough.  Patient states the past day or so she has been having chest tightness.  History of asthma as a child.  Patient denies fever, chills, sore throat or GI symptoms.        Review of Systems   Review of Systems   Constitutional:  Negative for chills and fever.   HENT:  Positive for congestion, rhinorrhea and sinus pressure. Negative for sore throat.    Respiratory:  Positive for cough.    Gastrointestinal:  Negative for diarrhea, nausea and vomiting.   Musculoskeletal:  Negative for myalgias.   Neurological:  Negative for headaches.         Current Medications       Current Outpatient Medications:     albuterol (PROVENTIL HFA,VENTOLIN HFA) 90 mcg/act inhaler,  Inhale 2 puffs every 4 (four) hours as needed for wheezing or shortness of breath, Disp: 18 g, Rfl: 0    buPROPion (Wellbutrin SR) 150 mg 12 hr tablet, Take 1 tablet (150 mg total) by mouth in the morning, Disp: 90 tablet, Rfl: 1    busPIRone (BUSPAR) 5 mg tablet, Take 1 tablet (5 mg total) by mouth 3 (three) times a day, Disp: 90 tablet, Rfl: 1    cefdinir (OMNICEF) 300 mg capsule, Take 1 capsule (300 mg total) by mouth every 12 (twelve) hours for 7 days, Disp: 14 capsule, Rfl: 0    clonazePAM (KlonoPIN) 0.5 mg tablet, Take one to two tablets daily at bedtime as needed for insomnia, Disp: 30 tablet, Rfl: 0    escitalopram (LEXAPRO) 10 mg tablet, Take 1 tablet (10 mg total) by mouth daily, Disp: 30 tablet, Rfl: 2    escitalopram (LEXAPRO) 5 mg tablet, Take 1 tablet (5 mg total) by mouth daily Take with 10mg tablet for total of 15mg., Disp: 30 tablet, Rfl: 1    meclizine (ANTIVERT) 12.5 MG tablet, Take 1 tablet (12.5 mg total) by mouth every 12 (twelve) hours as needed for dizziness, Disp: 30 tablet, Rfl: 0    metFORMIN (GLUCOPHAGE-XR) 500 mg 24 hr tablet, Take 2 tablets (1,000 mg total) by mouth 2 (two) times a day with meals (Patient not taking: Reported on 4/21/2025), Disp: 360 tablet, Rfl: 1    ondansetron (ZOFRAN) 4 mg tablet, Take 1 tablet (4 mg total) by mouth every 8 (eight) hours as needed for nausea or vomiting for up to 3 days (Patient not taking: Reported on 4/21/2025), Disp: 10 tablet, Rfl: 0    Current Allergies     Allergies as of 04/21/2025 - Reviewed 04/21/2025   Allergen Reaction Noted    Mirtazapine Irritability 01/02/2024    Amoxicillin Hives 10/01/2012            The following portions of the patient's history were reviewed and updated as appropriate: allergies, current medications, past family history, past medical history, past social history, past surgical history and problem list.     Past Medical History:   Diagnosis Date    Benign liver cyst     Class 2 obesity due to excess calories with  "body mass index (BMI) of 35.0 to 35.9 in adult 06/13/2022    Depression     Ovarian cyst     PONV (postoperative nausea and vomiting)     Seasonal allergies        Past Surgical History:   Procedure Laterality Date    APPENDECTOMY      CHOLECYSTECTOMY  11/20/2014    DILATION AND CURETTAGE OF UTERUS  06/01/2023    Emergency - [facility] [place]    HERNIA REPAIR  2009    x3    LIVER SURGERY  11/20/2014    liver cyst resection with removal of lesion    OVARIAN CYST REMOVAL Right 2006    [place] HYSTEROSCOPY BX ENDOMETRIUM&/POLYPC W/WO D&C N/A 07/21/2022    Procedure: HYSTEROSCOPY, BIOPSY;  Surgeon: Tara Budinetz, DO;  Location: [facility];  Service: Gynecology    [place] LAPAROSCOPY W/RMVL ADNEXAL STRUCTURES N/A 07/21/2022    Procedure: LAPAROSCOPY; FULGERATION OF ENDOMETRIOSIS; DRAINAGE OF B/L OVARIAN CYSTS;;  Surgeon: Tara Budinetz, DO;  Location: [facility];  Service: Gynecology       Family History   Problem Relation Age of Onset    Hypertension Mother     Irritable bowel syndrome Father     Breast cancer Maternal Aunt     Diabetes Maternal Grandfather     Cancer Other          Medications have been verified.        Objective   /89   Pulse 100   Temp (!) 97.4 °F (36.3 °C)   Resp 20 Comment: Chest tightness  Ht 5' 5\" (1.651 m)   Wt 89.4 kg (197 lb)   SpO2 99%   BMI 32.78 kg/m²   No LMP recorded.       Physical Exam     Physical Exam  Vitals and nursing note reviewed.   Constitutional:       Appearance: Normal appearance.   HENT:      Head: Normocephalic and atraumatic.      Right Ear: Tympanic membrane normal.      Left Ear: Tympanic membrane normal.      Nose: Congestion present.      Right Sinus: Maxillary sinus tenderness and frontal sinus tenderness present.      Left Sinus: Maxillary sinus tenderness and frontal sinus tenderness present.      Mouth/Throat:      Mouth: Mucous membranes are moist.      Pharynx: Oropharynx is clear.   Cardiovascular:      Rate and Rhythm: Normal rate and regular rhythm. "      Heart sounds: Normal heart sounds.   Pulmonary:      Effort: Pulmonary effort is normal.      Breath sounds: Normal breath sounds.   Musculoskeletal:      Cervical back: Neck supple.   Lymphadenopathy:      Cervical: No cervical adenopathy.   Skin:     General: Skin is warm.   Neurological:      Mental Status: She is alert.

## 2025-04-21 NOTE — PATIENT INSTRUCTIONS
Start antibiotic  Probiotics or yogurt to replace the good bacteria in your gut  Over the counter cold medication to control symptoms  Drink plenty of fluids  If symptoms fail to improve follow up with PCP  If symptoms worsen have yourself rechecked  \